# Patient Record
Sex: MALE | Race: WHITE | NOT HISPANIC OR LATINO | Employment: FULL TIME | URBAN - METROPOLITAN AREA
[De-identification: names, ages, dates, MRNs, and addresses within clinical notes are randomized per-mention and may not be internally consistent; named-entity substitution may affect disease eponyms.]

---

## 2017-07-06 ENCOUNTER — APPOINTMENT (EMERGENCY)
Dept: RADIOLOGY | Facility: HOSPITAL | Age: 57
End: 2017-07-06
Payer: COMMERCIAL

## 2017-07-06 ENCOUNTER — HOSPITAL ENCOUNTER (EMERGENCY)
Facility: HOSPITAL | Age: 57
Discharge: HOME | End: 2017-07-06
Attending: EMERGENCY MEDICINE | Admitting: EMERGENCY MEDICINE
Payer: COMMERCIAL

## 2017-07-06 VITALS
BODY MASS INDEX: 28.63 KG/M2 | OXYGEN SATURATION: 97 % | HEIGHT: 70 IN | DIASTOLIC BLOOD PRESSURE: 95 MMHG | SYSTOLIC BLOOD PRESSURE: 175 MMHG | RESPIRATION RATE: 18 BRPM | WEIGHT: 200 LBS | HEART RATE: 109 BPM | TEMPERATURE: 98.5 F

## 2017-07-06 DIAGNOSIS — L03.90 CELLULITIS: Primary | ICD-10-CM

## 2017-07-06 LAB
ALBUMIN SERPL BCP-MCNC: 4.3 G/DL (ref 3.5–5)
ALP SERPL-CCNC: 85 U/L (ref 46–116)
ALT SERPL W P-5'-P-CCNC: 62 U/L (ref 12–78)
ANION GAP SERPL CALCULATED.3IONS-SCNC: 9 MMOL/L (ref 4–13)
AST SERPL W P-5'-P-CCNC: 25 U/L (ref 5–45)
BASOPHILS # BLD AUTO: 0 THOUSANDS/ΜL (ref 0–0.1)
BASOPHILS NFR BLD AUTO: 0 % (ref 0–1)
BILIRUB SERPL-MCNC: 0.3 MG/DL (ref 0.2–1)
BUN SERPL-MCNC: 21 MG/DL (ref 5–25)
CALCIUM SERPL-MCNC: 9.3 MG/DL (ref 8.3–10.1)
CHLORIDE SERPL-SCNC: 105 MMOL/L (ref 100–108)
CO2 SERPL-SCNC: 26 MMOL/L (ref 21–32)
CREAT SERPL-MCNC: 1.29 MG/DL (ref 0.6–1.3)
EOSINOPHIL # BLD AUTO: 0.1 THOUSAND/ΜL (ref 0–0.61)
EOSINOPHIL NFR BLD AUTO: 1 % (ref 0–6)
ERYTHROCYTE [DISTWIDTH] IN BLOOD BY AUTOMATED COUNT: 13.4 % (ref 11.6–15.1)
ERYTHROCYTE [SEDIMENTATION RATE] IN BLOOD: 2 MM/HOUR (ref 2–10)
GFR SERPL CREATININE-BSD FRML MDRD: 58 ML/MIN/1.73SQ M
GLUCOSE SERPL-MCNC: 106 MG/DL (ref 65–140)
HCT VFR BLD AUTO: 44.7 % (ref 42–52)
HGB BLD-MCNC: 15.4 G/DL (ref 14–18)
LACTATE SERPL-SCNC: 1.1 MMOL/L (ref 0.5–2)
LYMPHOCYTES # BLD AUTO: 1 THOUSANDS/ΜL (ref 0.6–4.47)
LYMPHOCYTES NFR BLD AUTO: 16 % (ref 14–44)
MCH RBC QN AUTO: 29 PG (ref 27–31)
MCHC RBC AUTO-ENTMCNC: 34.4 G/DL (ref 31.4–37.4)
MCV RBC AUTO: 84 FL (ref 82–98)
MONOCYTES # BLD AUTO: 0.8 THOUSAND/ΜL (ref 0.17–1.22)
MONOCYTES NFR BLD AUTO: 12 % (ref 4–12)
NEUTROPHILS # BLD AUTO: 4.5 THOUSANDS/ΜL (ref 1.85–7.62)
NEUTS SEG NFR BLD AUTO: 70 % (ref 43–75)
NRBC BLD AUTO-RTO: 0 /100 WBCS
PLATELET # BLD AUTO: 178 THOUSANDS/UL (ref 130–400)
PMV BLD AUTO: 9 FL (ref 8.9–12.7)
POTASSIUM SERPL-SCNC: 4 MMOL/L (ref 3.5–5.3)
PROT SERPL-MCNC: 7.2 G/DL (ref 6.4–8.2)
RBC # BLD AUTO: 5.3 MILLION/UL (ref 4.7–6.1)
SODIUM SERPL-SCNC: 140 MMOL/L (ref 136–145)
WBC # BLD AUTO: 6.4 THOUSAND/UL (ref 4.8–10.8)

## 2017-07-06 PROCEDURE — 83605 ASSAY OF LACTIC ACID: CPT | Performed by: EMERGENCY MEDICINE

## 2017-07-06 PROCEDURE — 71260 CT THORAX DX C+: CPT

## 2017-07-06 PROCEDURE — 36415 COLL VENOUS BLD VENIPUNCTURE: CPT | Performed by: EMERGENCY MEDICINE

## 2017-07-06 PROCEDURE — 80053 COMPREHEN METABOLIC PANEL: CPT | Performed by: EMERGENCY MEDICINE

## 2017-07-06 PROCEDURE — 96365 THER/PROPH/DIAG IV INF INIT: CPT

## 2017-07-06 PROCEDURE — 87040 BLOOD CULTURE FOR BACTERIA: CPT | Performed by: EMERGENCY MEDICINE

## 2017-07-06 PROCEDURE — 85025 COMPLETE CBC W/AUTO DIFF WBC: CPT | Performed by: EMERGENCY MEDICINE

## 2017-07-06 PROCEDURE — 85652 RBC SED RATE AUTOMATED: CPT | Performed by: EMERGENCY MEDICINE

## 2017-07-06 PROCEDURE — 99284 EMERGENCY DEPT VISIT MOD MDM: CPT

## 2017-07-06 PROCEDURE — 96366 THER/PROPH/DIAG IV INF ADDON: CPT

## 2017-07-06 RX ORDER — SULFAMETHOXAZOLE AND TRIMETHOPRIM 800; 160 MG/1; MG/1
1 TABLET ORAL EVERY 12 HOURS SCHEDULED
COMMUNITY
End: 2017-07-09 | Stop reason: ALTCHOICE

## 2017-07-06 RX ORDER — CLINDAMYCIN HYDROCHLORIDE 150 MG/1
300 CAPSULE ORAL EVERY 6 HOURS
Qty: 40 CAPSULE | Refills: 0 | Status: SHIPPED | OUTPATIENT
Start: 2017-07-06 | End: 2017-07-16

## 2017-07-06 RX ORDER — DOXYCYCLINE HYCLATE 100 MG/1
100 CAPSULE ORAL 2 TIMES DAILY
Qty: 20 CAPSULE | Refills: 0 | Status: SHIPPED | OUTPATIENT
Start: 2017-07-06 | End: 2017-07-16

## 2017-07-06 RX ADMIN — IOHEXOL 85 ML: 350 INJECTION, SOLUTION INTRAVENOUS at 20:53

## 2017-07-06 RX ADMIN — VANCOMYCIN HYDROCHLORIDE 1250 MG: 1 INJECTION, POWDER, LYOPHILIZED, FOR SOLUTION INTRAVENOUS at 19:25

## 2017-07-09 ENCOUNTER — HOSPITAL ENCOUNTER (EMERGENCY)
Facility: HOSPITAL | Age: 57
Discharge: HOME | End: 2017-07-09
Admitting: EMERGENCY MEDICINE
Payer: COMMERCIAL

## 2017-07-09 VITALS
DIASTOLIC BLOOD PRESSURE: 90 MMHG | HEIGHT: 70 IN | OXYGEN SATURATION: 97 % | HEART RATE: 90 BPM | SYSTOLIC BLOOD PRESSURE: 158 MMHG | BODY MASS INDEX: 28.63 KG/M2 | TEMPERATURE: 98.4 F | RESPIRATION RATE: 18 BRPM | WEIGHT: 200 LBS

## 2017-07-09 DIAGNOSIS — Z51.89 VISIT FOR WOUND CHECK: Primary | ICD-10-CM

## 2017-07-09 PROCEDURE — 99282 EMERGENCY DEPT VISIT SF MDM: CPT

## 2017-07-11 LAB
BACTERIA BLD CULT: NORMAL
BACTERIA BLD CULT: NORMAL

## 2019-11-19 ENCOUNTER — HOSPITAL ENCOUNTER (EMERGENCY)
Facility: HOSPITAL | Age: 59
Discharge: HOME | End: 2019-11-19
Attending: EMERGENCY MEDICINE
Payer: COMMERCIAL

## 2019-11-19 ENCOUNTER — APPOINTMENT (EMERGENCY)
Dept: RADIOLOGY | Facility: HOSPITAL | Age: 59
End: 2019-11-19
Payer: COMMERCIAL

## 2019-11-19 VITALS
SYSTOLIC BLOOD PRESSURE: 138 MMHG | BODY MASS INDEX: 26.6 KG/M2 | HEART RATE: 107 BPM | DIASTOLIC BLOOD PRESSURE: 87 MMHG | WEIGHT: 190 LBS | RESPIRATION RATE: 20 BRPM | HEIGHT: 71 IN | TEMPERATURE: 97.7 F | OXYGEN SATURATION: 98 %

## 2019-11-19 DIAGNOSIS — S61.411A LACERATION OF RIGHT HAND: Primary | ICD-10-CM

## 2019-11-19 PROCEDURE — 99283 EMERGENCY DEPT VISIT LOW MDM: CPT

## 2019-11-19 PROCEDURE — 73130 X-RAY EXAM OF HAND: CPT

## 2019-11-19 RX ORDER — GINSENG 100 MG
1 CAPSULE ORAL ONCE
Status: COMPLETED | OUTPATIENT
Start: 2019-11-19 | End: 2019-11-19

## 2019-11-19 RX ORDER — LIDOCAINE HYDROCHLORIDE 10 MG/ML
5 INJECTION, SOLUTION EPIDURAL; INFILTRATION; INTRACAUDAL; PERINEURAL ONCE
Status: COMPLETED | OUTPATIENT
Start: 2019-11-19 | End: 2019-11-19

## 2019-11-19 RX ADMIN — BACITRACIN 1 SMALL APPLICATION: 500 OINTMENT TOPICAL at 09:54

## 2019-11-19 RX ADMIN — LIDOCAINE HYDROCHLORIDE 5 ML: 10 INJECTION, SOLUTION EPIDURAL; INFILTRATION; INTRACAUDAL; PERINEURAL at 09:53

## 2019-11-19 NOTE — ED PROVIDER NOTES
History  Chief Complaint   Patient presents with    Hand Laceration     1 cm laceration to patient's R palm  Patient reports he stabbed hand with piece of glass with throwing something in trash  Patient reports he is UTD on tetanus vac       61-year-old male presenting today with a right hand laceration that occurred this morning after he was taking out his garbage and a piece of glass cut him  States he believes he got all the glass out  Patient was wearing gloves at the time  This is his dominant hand  Currently no pain  Up-to-date on tetanus vaccination  Denies numbness, paresthesias, weakness  None       History reviewed  No pertinent past medical history  History reviewed  No pertinent surgical history  History reviewed  No pertinent family history  I have reviewed and agree with the history as documented  Social History     Tobacco Use    Smoking status: Never Smoker    Smokeless tobacco: Never Used   Substance Use Topics    Alcohol use: No    Drug use: No        Review of Systems   Constitutional: Negative  HENT: Negative  Eyes: Negative  Respiratory: Negative  Cardiovascular: Negative  Gastrointestinal: Negative  Genitourinary: Negative  Musculoskeletal: Negative  Skin: Positive for wound  Negative for color change, pallor and rash  Neurological: Negative  All other systems reviewed and are negative  Physical Exam  Physical Exam   Constitutional: He is oriented to person, place, and time  He appears well-developed and well-nourished  HENT:   Head: Normocephalic and atraumatic  Nose: Nose normal    Eyes: Conjunctivae are normal    Cardiovascular: Normal rate and intact distal pulses  Pulmonary/Chest: Effort normal    S PO2 is 98% indicating adequate oxygenation   Musculoskeletal: Normal range of motion  He exhibits no edema or deformity  Arms:  Neurological: He is alert and oriented to person, place, and time     Skin: Skin is warm and dry  Capillary refill takes less than 2 seconds  Nursing note and vitals reviewed  Vital Signs  ED Triage Vitals [11/19/19 0933]   Temperature Pulse Respirations Blood Pressure SpO2   97 7 °F (36 5 °C) (!) 107 20 138/87 98 %      Temp Source Heart Rate Source Patient Position - Orthostatic VS BP Location FiO2 (%)   Tympanic Monitor Sitting Right arm --      Pain Score       No Pain           Vitals:    11/19/19 0933   BP: 138/87   Pulse: (!) 107   Patient Position - Orthostatic VS: Sitting         Visual Acuity      ED Medications  Medications   lidocaine (PF) (XYLOCAINE-MPF) 1 % injection 5 mL (5 mL Infiltration Given 11/19/19 0953)   bacitracin topical ointment 1 small application (1 small application Topical Given 11/19/19 0954)       Diagnostic Studies  Results Reviewed     None                 XR hand 3+ views RIGHT   ED Interpretation by Brandon Hendricks PA-C (11/19 1012)   No acute osseous abnormality or foreign bodies noted  Final Result by Dalila Ruth MD (11/19 1011)      No acute osseous abnormality  Workstation performed: [id number]                    Procedures  Laceration repair  Date/Time: 11/19/2019 10:14 AM  Performed by: Brandon Hendricks PA-C  Authorized by: Brandon Hendricks PA-C   Consent: Verbal consent obtained  Risks and benefits: risks, benefits and alternatives were discussed  Consent given by: patient  Patient understanding: patient states understanding of the procedure being performed  Patient consent: the patient's understanding of the procedure matches consent given  Procedure consent: procedure consent matches procedure scheduled  Relevant documents: relevant documents present and verified  Test results: test results available and properly labeled  Site marked: the operative site was marked  Radiology Images displayed and confirmed   If images not available, report reviewed: imaging studies available  Required items: required blood products, implants, devices, and special equipment available  Patient identity confirmed: verbally with patient  Time out: Immediately prior to procedure a "time out" was called to verify the correct patient, procedure, equipment, support staff and site/side marked as required  Body area: upper extremity  Location details: right hand  Laceration length: 2 cm  Foreign bodies: no foreign bodies  Tendon involvement: none  Nerve involvement: none  Anesthesia: local infiltration    Anesthesia:  Local Anesthetic: lidocaine 1% without epinephrine    Wound Dehiscence:  Superficial Wound Dehiscence: simple closure      Procedure Details:  Preparation: Patient was prepped and draped in the usual sterile fashion  Irrigation solution: saline  Irrigation method: syringe  Amount of cleaning: standard  Debridement: none  Degree of undermining: none  Skin closure: 5-0 nylon  Number of sutures: 2  Technique: simple  Approximation: close  Approximation difficulty: simple  Dressing: antibiotic ointment, 4x4 sterile gauze and gauze roll  Patient tolerance: Patient tolerated the procedure well with no immediate complications             ED Course                               MDM  Number of Diagnoses or Management Options  Laceration of right hand:   Diagnosis management comments: Small laceration was closed with 2 stitches  Patient formed return 10 days for suture removal or sooner for any signs infection or worsening of symptoms  Patient also given thorough wound care instructions informed that he is at risk for this wounds putting open given poor location over joint  Patient verbalizes understanding and agrees with the above assessment plan         Amount and/or Complexity of Data Reviewed  Tests in the radiology section of CPT®: reviewed and ordered  Review and summarize past medical records: yes  Independent visualization of images, tracings, or specimens: yes        Disposition  Final diagnoses:   Laceration of right hand     Time reflects when diagnosis was documented in both MDM as applicable and the Disposition within this note     Time User Action Codes Description Comment    11/19/2019 10:13 AM Андрей Conner Add [[id number]] Laceration of right hand       ED Disposition     ED Disposition Condition Date/Time Comment    Discharge Stable Tue Nov 19, 2019 10:13 AM Shonda Hughes discharge to home/self care  Follow-up Information     Follow up With Specialties Details Why Contact Info Additional [address] Emergency Department Emergency Medicine Go in 10 days For suture removal or sooner for re-evaluation [address] [facility] [address]  [phone] [facility] ED[facility] [address]          Patient's Medications    No medications on file     No discharge procedures on file      ED Provider  Electronically Signed by           Jerry Cardona PA-C  11/19/19 1319

## 2022-07-11 ENCOUNTER — OFFICE VISIT (OUTPATIENT)
Dept: URGENT CARE | Facility: CLINIC | Age: 62
End: 2022-07-11
Payer: COMMERCIAL

## 2022-07-11 VITALS — RESPIRATION RATE: 14 BRPM | HEART RATE: 103 BPM | TEMPERATURE: 97 F | OXYGEN SATURATION: 99 %

## 2022-07-11 DIAGNOSIS — M79.674 GREAT TOE PAIN, RIGHT: Primary | ICD-10-CM

## 2022-07-11 PROCEDURE — 99214 OFFICE O/P EST MOD 30 MIN: CPT | Performed by: PHYSICIAN ASSISTANT

## 2022-07-11 RX ORDER — COLCHICINE 0.6 MG/1
0.6 TABLET ORAL DAILY
Qty: 6 TABLET | Refills: 0 | Status: SHIPPED | OUTPATIENT
Start: 2022-07-11

## 2022-07-11 NOTE — PROGRESS NOTES
[facility] [employer] [facility]        NAME: Eugenio Correa is a 63 y o  male  : 1960    MRN: [id number]  DATE: 2022  TIME: 6:04 PM    Assessment and Plan   Great toe pain, right [M79 [id number]]  1  Great toe pain, right  colchicine (COLCRYS) 0 6 mg tablet         Patient Instructions   There are no Patient Instructions on file for this visit  Follow up with PCP in 3-5 days  Proceed to  ER if symptoms worsen  Chief Complaint     Chief Complaint   Patient presents with    Pain     Pt presents with right foot swelling  Redness, below the right big toe; started on Saturday         History of Present Illness       The pt is a 30-year-old male presenting for possible gout in his right great toe  He reported pain, erythema and swelling of the great toe  Symptoms have been going on for 3 days  He denies any injury  Prior episodes  Last episode was 2 years ago  Review of Systems   Review of Systems   Constitutional: Negative for activity change, appetite change, chills, diaphoresis and fever  HENT: Negative for congestion, rhinorrhea and sore throat  Respiratory: Negative for cough, chest tightness and shortness of breath  Cardiovascular: Negative for chest pain and palpitations  Gastrointestinal: Negative for abdominal pain, diarrhea, nausea and vomiting  Musculoskeletal: Positive for gait problem, joint swelling and myalgias  Negative for arthralgias  Skin: Positive for color change  Negative for pallor, rash and wound  Neurological: Negative for headaches           Current Medications       Current Outpatient Medications:     colchicine (COLCRYS) 0 6 mg tablet, Take 1 tablet (0 6 mg total) by mouth daily Take two 0 6-mg tablets orally NOW followed by 0 6 mg (1 tablet) one hour later Take 1 a day until resolution, Disp: 6 tablet, Rfl: 0    Current Allergies     Allergies as of 2022    (No Known Allergies)            The following portions of the patient's history were reviewed and updated as appropriate: allergies, current medications, past family history, past medical history, past social history, past surgical history and problem list      Past Medical History:   Diagnosis Date    Gout        Past Surgical History:   Procedure Laterality Date    NO PAST SURGERIES         History reviewed  No pertinent family history  Medications have been verified  Objective   Pulse 103   Temp (!) 97 °F (36 1 °C)   Resp 14   SpO2 99%        Physical Exam     Physical Exam  Vitals and nursing note reviewed  Constitutional:       General: He is not in acute distress  Appearance: Normal appearance  He is normal weight  He is not ill-appearing, toxic-appearing or diaphoretic  HENT:      Head: Normocephalic and atraumatic  Cardiovascular:      Rate and Rhythm: Normal rate and regular rhythm  Heart sounds: Normal heart sounds  No murmur heard  No friction rub  No gallop  Pulmonary:      Effort: Pulmonary effort is normal  No respiratory distress  Breath sounds: Normal breath sounds  No stridor  No wheezing, rhonchi or rales  Chest:      Chest wall: No tenderness  Musculoskeletal:         General: Swelling and tenderness present  Cervical back: Normal range of motion  Comments: Redness and tenderness to the R great toe     Lymphadenopathy:      Cervical: No cervical adenopathy  Skin:     General: Skin is warm and dry  Capillary Refill: Capillary refill takes less than 2 seconds  Findings: Erythema present  Neurological:      Mental Status: He is alert

## 2023-06-01 ENCOUNTER — HOSPITAL ENCOUNTER (EMERGENCY)
Facility: HOSPITAL | Age: 63
Discharge: HOME | End: 2023-06-01
Attending: EMERGENCY MEDICINE
Payer: COMMERCIAL

## 2023-06-01 VITALS
SYSTOLIC BLOOD PRESSURE: 152 MMHG | DIASTOLIC BLOOD PRESSURE: 91 MMHG | TEMPERATURE: 96.2 F | BODY MASS INDEX: 28.51 KG/M2 | WEIGHT: 204.4 LBS | HEART RATE: 104 BPM | OXYGEN SATURATION: 100 % | RESPIRATION RATE: 18 BRPM

## 2023-06-01 DIAGNOSIS — T14.8XXA SPLINTER IN SKIN: Primary | ICD-10-CM

## 2023-06-01 PROCEDURE — 90471 IMMUNIZATION ADMIN: CPT

## 2023-06-01 PROCEDURE — 90715 TDAP VACCINE 7 YRS/> IM: CPT | Performed by: PHYSICIAN ASSISTANT

## 2023-06-01 PROCEDURE — 99283 EMERGENCY DEPT VISIT LOW MDM: CPT

## 2023-06-01 RX ORDER — LIDOCAINE HYDROCHLORIDE 10 MG/ML
10 INJECTION, SOLUTION EPIDURAL; INFILTRATION; INTRACAUDAL; PERINEURAL ONCE
Status: COMPLETED | OUTPATIENT
Start: 2023-06-01 | End: 2023-06-01

## 2023-06-01 RX ORDER — GINSENG 100 MG
1 CAPSULE ORAL ONCE
Status: COMPLETED | OUTPATIENT
Start: 2023-06-01 | End: 2023-06-01

## 2023-06-01 RX ORDER — CEPHALEXIN 500 MG/1
500 CAPSULE ORAL EVERY 6 HOURS SCHEDULED
Qty: 20 CAPSULE | Refills: 0 | Status: SHIPPED | OUTPATIENT
Start: 2023-06-01 | End: 2023-06-06

## 2023-06-01 RX ORDER — CEPHALEXIN 500 MG/1
500 CAPSULE ORAL ONCE
Status: COMPLETED | OUTPATIENT
Start: 2023-06-01 | End: 2023-06-01

## 2023-06-01 RX ADMIN — TETANUS TOXOID, REDUCED DIPHTHERIA TOXOID AND ACELLULAR PERTUSSIS VACCINE, ADSORBED 0.5 ML: 5; 2.5; 8; 8; 2.5 SUSPENSION INTRAMUSCULAR at 13:47

## 2023-06-01 RX ADMIN — BACITRACIN 1 SMALL APPLICATION: 500 OINTMENT TOPICAL at 13:51

## 2023-06-01 RX ADMIN — CEPHALEXIN 500 MG: 500 CAPSULE ORAL at 13:47

## 2023-06-01 RX ADMIN — LIDOCAINE HYDROCHLORIDE 10 ML: 10 INJECTION, SOLUTION EPIDURAL; INFILTRATION; INTRACAUDAL; PERINEURAL at 13:15

## 2023-06-01 NOTE — DISCHARGE INSTRUCTIONS
Warm soaks 15 minutes three times daily    Follow up with hand orthopedist Dr Louise Blair for further evaluation as advised    Return to ED for signs of wound infection

## 2023-06-01 NOTE — ED PROVIDER NOTES
History  Chief Complaint   Patient presents with   • Foreign Body in Skin     States he had his hand on a handrail going down steps and got a splinter in left second finger  Patient is a 68-year-old male reports he was rushing down a staircase at home when he got a splinter in his left index finger  He reports trying to remove from the entry and exit point but was unsuccessful  No finger numbness or tingling  Unsure of last tetanus shot  No other complaints          Prior to Admission Medications   Prescriptions Last Dose Informant Patient Reported? Taking?   colchicine (COLCRYS) 0 6 mg tablet   No No   Sig: Take 1 tablet (0 6 mg total) by mouth daily Take two 0 6-mg tablets orally NOW followed by 0 6 mg (1 tablet) one hour later Take 1 a day until resolution      Facility-Administered Medications: None       Past Medical History:   Diagnosis Date   • Gout        Past Surgical History:   Procedure Laterality Date   • NO PAST SURGERIES         History reviewed  No pertinent family history  I have reviewed and agree with the history as documented  E-Cigarette/Vaping   • E-Cigarette Use Never User      E-Cigarette/Vaping Substances     Social History     Tobacco Use   • Smoking status: Never   • Smokeless tobacco: Never   Vaping Use   • Vaping Use: Never used   Substance Use Topics   • Alcohol use: Yes   • Drug use: No       Review of Systems   Constitutional: Negative for chills and fever  HENT: Negative for ear pain and sore throat  Respiratory: Negative for cough and shortness of breath  Cardiovascular: Negative for chest pain and palpitations  Gastrointestinal: Negative for abdominal pain and vomiting  Genitourinary: Negative for dysuria and hematuria  Musculoskeletal: Negative for arthralgias and back pain  Skin: Positive for wound  Negative for color change and rash  Neurological: Negative for syncope  All other systems reviewed and are negative        Physical Exam  Physical Exam  Vitals and nursing note reviewed  Constitutional:       General: He is not in acute distress  Appearance: Normal appearance  He is not ill-appearing, toxic-appearing or diaphoretic  Cardiovascular:      Rate and Rhythm: Normal rate and regular rhythm  Pulses: Normal pulses  Heart sounds: Normal heart sounds  Pulmonary:      Effort: Pulmonary effort is normal       Breath sounds: Normal breath sounds  Skin:     General: Skin is warm and dry  Capillary Refill: Capillary refill takes less than 2 seconds  Comments: Left second finger: no protruding splinter noted  There is subcutaneous dark punctate area finger pad and another area distal fingerpad at nailmargin  Neurological:      Mental Status: He is alert and oriented to person, place, and time  Sensory: No sensory deficit           Vital Signs  ED Triage Vitals [06/01/23 1257]   Temperature Pulse Respirations Blood Pressure SpO2   (!) 96 2 °F (35 7 °C) 104 18 152/91 100 %      Temp Source Heart Rate Source Patient Position - Orthostatic VS BP Location FiO2 (%)   Tympanic Monitor Sitting Right arm --      Pain Score       --           Vitals:    06/01/23 1257   BP: 152/91   Pulse: 104   Patient Position - Orthostatic VS: Sitting         Visual Acuity      ED Medications  Medications   lidocaine (PF) (XYLOCAINE-MPF) 1 % injection 10 mL (10 mL Infiltration Given 6/1/23 1315)   cephalexin (KEFLEX) capsule 500 mg (500 mg Oral Given 6/1/23 1347)   tetanus-diphtheria-acellular pertussis (BOOSTRIX) IM injection 0 5 mL (0 5 mL Intramuscular Given 6/1/23 1347)   bacitracin topical ointment 1 small application (1 small application Topical Given 6/1/23 1351)       Diagnostic Studies  Results Reviewed     None                 No orders to display              Procedures  Foreign Body - Embedded    Date/Time: 6/1/2023 2:17 PM    Performed by: Ciaran Rome PA-C  Authorized by: Ciaran Rome PA-C    Patient location: ED  Other Assisting Provider: No    Consent:     Consent obtained:  Verbal    Consent given by:  Patient    Risks discussed:  Bleeding, infection, worsening of condition, pain, nerve damage and incomplete removal    Alternatives discussed:  Observation  Universal protocol:     Procedure explained and questions answered to patient or proxy's satisfaction: yes      Relevant documents present and verified: yes      Test results available and properly labeled: yes      Radiology Images displayed and confirmed  If images not available, report reviewed : yes      Required blood products, implants, devices, and special equipment available: yes      Site/side marked: yes      Immediately prior to procedure, a time out was called: yes      Patient identity confirmed:  Verbally with patient and arm band  Location:     Location:  Finger    Finger location:  L index finger    Depth:  Subcutaneous    Tendon involvement:  None  Pre-procedure details:     Imaging:  None    Preparation: Patient was prepped and draped in usual sterile fashion    Anesthesia (see MAR for exact dosages): Anesthesia method:  Nerve block    Block anesthetic:  Lidocaine 1% w/o epi    Block injection procedure:  Anatomic landmarks identified and introduced needle    Block outcome:  Anesthesia achieved  Procedure details:     Scalpel size:  11    Incision length:  0 5    Localization method:  Probed    Bloodless field: no      Removal mechanism: Forceps    Removal Method:  Open and percutaneous    Foreign bodies recovered:  2    Intact foreign body removal: no    Post-procedure details:     Neurovascular status: intact      Confirmation:  No additional foreign bodies on visualization    Skin closure:  None    Dressing:  Antibiotic ointment    Patient tolerance of procedure:   Tolerated well, no immediate complications  Comments:      Small foreign body removed proximally and distally              ED Course                               SBIRT 22yo+ Flowsheet Row Most Recent Value   Initial Alcohol Screen: US AUDIT-C     1  How often do you have a drink containing alcohol? 0 Filed at: 06/01/2023 1300   Audit-C Score 0 Filed at: 06/01/2023 1300   KASH: How many times in the past year have you    Used an illegal drug or used a prescription medication for non-medical reasons? Never Filed at: 06/01/2023 1300                    Medical Decision Making  Left second finger was anesthetized by digital block with 1% plain lidocaine  Under sterile chlorhexidine prep, removed small splinters from entrance and exit point  Patient was advised there may be retained splinter  in between which cannot be visualized at this time  Tiger texted hand ortho Dr Luh Muñoz  She can see patient in office follow up I advised  warm soaks 3 times daily  Keflex given in the ER and prescription for the same  Return precautions given  Risk  OTC drugs  Prescription drug management  Disposition  Final diagnoses:   Splinter in skin     Time reflects when diagnosis was documented in both MDM as applicable and the Disposition within this note     Time User Action Codes Description Comment    6/1/2023  1:43 PM Con, [facility] Jamison Cummingstenzin Gatica in skin       ED Disposition     ED Disposition   Discharge    Condition   Stable    Date/Time   Thu Jun 1, 2023  1:43 PM    Comment   Cindy Card discharge to home/self care                 Follow-up Information     Follow up With Specialties Details Why [address] [facility]   [address] [place] [address] Jani Mann  [phone]            Discharge Medication List as of 6/1/2023  1:47 PM      START taking these medications    Details   cephalexin (KEFLEX) 500 mg capsule Take 1 capsule (500 mg total) by mouth every 6 (six) hours for 5 days, Starting Thu 6/1/2023, Until Tue 6/6/2023, Normal         CONTINUE these medications which have NOT CHANGED Details   colchicine (COLCRYS) 0 6 mg tablet Take 1 tablet (0 6 mg total) by mouth daily Take two 0 6-mg tablets orally NOW followed by 0 6 mg (1 tablet) one hour later Take 1 a day until resolution, Starting Mon 7/11/2022, Normal             No discharge procedures on file      PDMP Review     None          ED Provider  Electronically Signed by           Ciaran Rome PA-C  06/01/23 9326

## 2024-02-29 ENCOUNTER — RA CDI HCC (OUTPATIENT)
Dept: OTHER | Facility: HOSPITAL | Age: 64
End: 2024-02-29

## 2024-02-29 NOTE — PROGRESS NOTES
HCC coding opportunities       Chart reviewed, no opportunity found: CHART REVIEWED, NO OPPORTUNITY FOUND        Patients Insurance        Commercial Insurance: [employer]

## 2024-03-07 ENCOUNTER — OFFICE VISIT (OUTPATIENT)
Dept: FAMILY MEDICINE CLINIC | Facility: CLINIC | Age: 64
End: 2024-03-07
Payer: COMMERCIAL

## 2024-03-07 VITALS
SYSTOLIC BLOOD PRESSURE: 140 MMHG | DIASTOLIC BLOOD PRESSURE: 80 MMHG | OXYGEN SATURATION: 99 % | HEIGHT: 71 IN | WEIGHT: 205 LBS | HEART RATE: 97 BPM | BODY MASS INDEX: 28.7 KG/M2

## 2024-03-07 DIAGNOSIS — E78.5 DYSLIPIDEMIA: ICD-10-CM

## 2024-03-07 DIAGNOSIS — Z12.5 SCREENING FOR PROSTATE CANCER: ICD-10-CM

## 2024-03-07 DIAGNOSIS — R39.14 BENIGN PROSTATIC HYPERPLASIA WITH INCOMPLETE BLADDER EMPTYING: Primary | ICD-10-CM

## 2024-03-07 DIAGNOSIS — N40.1 BENIGN PROSTATIC HYPERPLASIA WITH INCOMPLETE BLADDER EMPTYING: Primary | ICD-10-CM

## 2024-03-07 DIAGNOSIS — Z13.0 SCREENING FOR DEFICIENCY ANEMIA: ICD-10-CM

## 2024-03-07 DIAGNOSIS — Z13.29 SCREENING FOR THYROID DISORDER: ICD-10-CM

## 2024-03-07 DIAGNOSIS — R73.03 PREDIABETES: ICD-10-CM

## 2024-03-07 DIAGNOSIS — I10 PRIMARY HYPERTENSION: ICD-10-CM

## 2024-03-07 DIAGNOSIS — Z12.11 SCREENING FOR COLON CANCER: ICD-10-CM

## 2024-03-07 DIAGNOSIS — Z13.1 SCREENING FOR DIABETES MELLITUS: ICD-10-CM

## 2024-03-07 PROCEDURE — 99204 OFFICE O/P NEW MOD 45 MIN: CPT | Performed by: INTERNAL MEDICINE

## 2024-03-07 RX ORDER — TAMSULOSIN HYDROCHLORIDE 0.4 MG/1
0.4 CAPSULE ORAL
Qty: 30 CAPSULE | Refills: 1 | Status: SHIPPED | OUTPATIENT
Start: 2024-03-07

## 2024-03-07 NOTE — ASSESSMENT & PLAN NOTE
Patient blood pressure has been running slightly on the higher side check x 2 it is around 140/80 first time 140/ 84-second time.  He does not have extra salt with his diet.  He is very active at the workplace.  No cardiovascular exercises.

## 2024-03-07 NOTE — ASSESSMENT & PLAN NOTE
As mentioned in HPI patient with increased frequency urgency hesitancy with passing urine rectal examination question mild enlargement in prostate will start the patient on tamsulosin 0.4 mg at bedtime and see how much difference it makes we will also get lab work done urinalysis and culture also done.

## 2024-03-07 NOTE — PROGRESS NOTES
Office Visit Note  24     Bon Snyder 63 y.o. male MRN: [id number]  : 1960    Assessment:     1. Benign prostatic hyperplasia with incomplete bladder emptying  Assessment & Plan:  As mentioned in HPI patient with increased frequency urgency hesitancy with passing urine rectal examination question mild enlargement in prostate will start the patient on tamsulosin 0.4 mg at bedtime and see how much difference it makes we will also get lab work done urinalysis and culture also done.    Orders:  -     tamsulosin (FLOMAX) 0.4 mg; Take 1 capsule (0.4 mg total) by mouth daily with dinner    2. Primary hypertension  Assessment & Plan:  Patient blood pressure has been running slightly on the higher side check x 2 it is around 140/80 first time 140/ 84-second time.  He does not have extra salt with his diet.  He is very active at the workplace.  No cardiovascular exercises.      3. Prediabetes  -     Albumin / creatinine urine ratio; Future  -     Comprehensive metabolic panel; Future  -     Hemoglobin A1C; Future  -     Urinalysis with microscopic; Future    4. Screening for deficiency anemia  -     CBC and differential; Future    5. Dyslipidemia  -     Lipid panel; Future    6. Screening for thyroid disorder  -     TSH, 3rd generation with Free T4 reflex; Future    7. Screening for diabetes mellitus    8. Screening for prostate cancer  -     PSA, total and free; Future    9. Screening for colon cancer  -     Cologuard               Discussion Summary and Plan:  Today's care plan and medications were reviewed with patient in detail and all their questions answered to their satisfaction.    Chief Complaint   Patient presents with    Establish Care      Subjective:  Patient is coming here for evaluation seeing him after a long.  Of time with no significant past medical history.  For the last 3 to 4 months patient has been having problem passing urine there is an increased frequency urgency and hesitancy  especially in the nighttime he has to get up frequently to pass urine.  No pain in the kidney area in the flank area no pain in the lower abdomen.    Patient's past medical history none surgical history none patient is non-smoker social alcohol.    Family history of diabetes with both father and mother but there is no history of prostate problems in the family his brother is also a diabetic.    Patient is going to have a Cologuard test done since he does not want to have colonoscopy will order the same        The following portions of the patient's history were reviewed and updated as appropriate: allergies, current medications, past family history, past medical history, past social history, past surgical history and problem list.    Review of Systems   Constitutional:  Negative for chills and fever.   HENT:  Negative for ear pain and sore throat.    Eyes:  Negative for pain and visual disturbance.   Respiratory:  Negative for cough and shortness of breath.    Cardiovascular:  Negative for chest pain and palpitations.   Gastrointestinal:  Negative for abdominal pain and vomiting.   Genitourinary:  Positive for difficulty urinating and frequency. Negative for dysuria and hematuria.   Musculoskeletal:  Negative for arthralgias and back pain.   Skin:  Negative for color change and rash.   Neurological:  Negative for seizures and syncope.   All other systems reviewed and are negative.        Historical Information   Patient Active Problem List   Diagnosis    Benign prostatic hyperplasia with incomplete bladder emptying    Primary hypertension     Past Medical History:   Diagnosis Date    Gout      Past Surgical History:   Procedure Laterality Date    NO PAST SURGERIES       Social History     Substance and Sexual Activity   Alcohol Use Yes     Social History     Substance and Sexual Activity   Drug Use No     Social History     Tobacco Use   Smoking Status Never   Smokeless Tobacco Never     History reviewed. No  "pertinent family history.  Health Maintenance Due   Topic    Hepatitis C Screening     HIV Screening     Annual Physical     Colorectal Cancer Screening     Zoster Vaccine (1 of 2)    Influenza Vaccine (1)    COVID-19 Vaccine (1 - 2023-24 season)      Meds/Allergies       Current Outpatient Medications:     tamsulosin (FLOMAX) 0.4 mg, Take 1 capsule (0.4 mg total) by mouth daily with dinner, Disp: 30 capsule, Rfl: 1      Objective:    Vitals:   /80 (BP Location: Right arm, Patient Position: Sitting, Cuff Size: Standard)   Pulse 97   Ht 5' 11\" (1.803 m)   Wt 93 kg (205 lb)   SpO2 99%   BMI 28.59 kg/m²   Body mass index is 28.59 kg/m².  Vitals:    03/07/24 1622   Weight: 93 kg (205 lb)       Physical Exam  Vitals and nursing note reviewed.   Constitutional:       Appearance: Normal appearance.   Cardiovascular:      Rate and Rhythm: Normal rate and regular rhythm.      Heart sounds: Normal heart sounds.   Pulmonary:      Effort: Pulmonary effort is normal.      Breath sounds: Normal breath sounds.   Abdominal:      General: Abdomen is flat. Bowel sounds are normal.      Palpations: Abdomen is soft.   Genitourinary:     Comments: Question enlargement of prostate  Musculoskeletal:         General: Normal range of motion.      Right lower leg: No edema.      Left lower leg: No edema.   Skin:     General: Skin is warm and dry.   Neurological:      General: No focal deficit present.      Mental Status: He is alert and oriented to person, place, and time.   Psychiatric:         Mood and Affect: Mood normal.         Behavior: Behavior normal.         Lab Review   No visits with results within 2 Month(s) from this visit.   Latest known visit with results is:   Admission on 07/06/2017, Discharged on 07/06/2017   Component Date Value Ref Range Status    WBC 07/06/2017 6.40  4.80 - 10.80 Thousand/uL Final    RBC 07/06/2017 5.30  4.70 - 6.10 Million/uL Final    Hemoglobin 07/06/2017 15.4  14.0 - 18.0 g/dL Final    " Hematocrit 07/06/2017 44.7  42.0 - 52.0 % Final    MCV 07/06/2017 84  82 - 98 fL Final    MCH 07/06/2017 29.0  27.0 - 31.0 pg Final    MCHC 07/06/2017 34.4  31.4 - 37.4 g/dL Final    RDW 07/06/2017 13.4  11.6 - 15.1 % Final    MPV 07/06/2017 9.0  8.9 - 12.7 fL Final    Platelets 07/06/2017 178  130 - 400 Thousands/uL Final    nRBC 07/06/2017 0  /100 WBCs Final    Neutrophils Relative 07/06/2017 70  43 - 75 % Final    Lymphocytes Relative 07/06/2017 16  14 - 44 % Final    Monocytes Relative 07/06/2017 12  4 - 12 % Final    Eosinophils Relative 07/06/2017 1  0 - 6 % Final    Basophils Relative 07/06/2017 0  0 - 1 % Final    Neutrophils Absolute 07/06/2017 4.50  1.85 - 7.62 Thousands/µL Final    Lymphocytes Absolute 07/06/2017 1.00  0.60 - 4.47 Thousands/µL Final    Monocytes Absolute 07/06/2017 0.80  0.17 - 1.22 Thousand/µL Final    Eosinophils Absolute 07/06/2017 0.10  0.00 - 0.61 Thousand/µL Final    Basophils Absolute 07/06/2017 0.00  0.00 - 0.10 Thousands/µL Final    Sodium 07/06/2017 140  136 - 145 mmol/L Final    Potassium 07/06/2017 4.0  3.5 - 5.3 mmol/L Final    Chloride 07/06/2017 105  100 - 108 mmol/L Final    CO2 07/06/2017 26  21 - 32 mmol/L Final    ANION GAP 07/06/2017 9  4 - 13 mmol/L Final    BUN 07/06/2017 21  5 - 25 mg/dL Final    Creatinine 07/06/2017 1.29  0.60 - 1.30 mg/dL Final    Standardized to IDMS reference method    Glucose 07/06/2017 106  65 - 140 mg/dL Final    If the patient is fasting, the ADA then defines impaired fasting glucose as > 100 mg/dL and diabetes as > or equal to 123 mg/dL.    Calcium 07/06/2017 9.3  8.3 - 10.1 mg/dL Final    AST 07/06/2017 25  5 - 45 U/L Final    ALT 07/06/2017 62  12 - 78 U/L Final    Alkaline Phosphatase 07/06/2017 85  46 - 116 U/L Final    Total Protein 07/06/2017 7.2  6.4 - 8.2 g/dL Final    Albumin 07/06/2017 4.3  3.5 - 5.0 g/dL Final    Total Bilirubin 07/06/2017 0.30  0.20 - 1.00 mg/dL Final    eGFR 07/06/2017 58.0  ml/min/1.73sq m Final    LACTIC  "ACID 07/06/2017 1.1  0.5 - 2.0 mmol/L Final    Blood Culture 07/06/2017 No Growth After 5 Days.   Final    Blood Culture 07/06/2017 No Growth After 5 Days.   Final    Sed Rate 07/06/2017 2  2 - 10 mm/hour Final         Monty Mitchell MD        \"This note has been constructed using a voice recognition system.Therefore there may be syntax, spelling, and/or grammatical errors. Please call if you have any questions. \"  "

## 2024-03-09 LAB
ALBUMIN SERPL-MCNC: 4.8 G/DL (ref 3.9–4.9)
ALBUMIN/CREAT UR: 16 MG/G CREAT (ref 0–29)
ALBUMIN/GLOB SERPL: 2.7 {RATIO} (ref 1.2–2.2)
ALP SERPL-CCNC: 83 IU/L (ref 44–121)
ALT SERPL-CCNC: 34 IU/L (ref 0–44)
APPEARANCE UR: CLEAR
AST SERPL-CCNC: 21 IU/L (ref 0–40)
BACTERIA URNS QL MICRO: NORMAL
BASOPHILS # BLD AUTO: 0 X10E3/UL (ref 0–0.2)
BASOPHILS NFR BLD AUTO: 0 %
BILIRUB SERPL-MCNC: 0.7 MG/DL (ref 0–1.2)
BILIRUB UR QL STRIP: NEGATIVE
BUN SERPL-MCNC: 19 MG/DL (ref 8–27)
BUN/CREAT SERPL: 18 (ref 10–24)
CALCIUM SERPL-MCNC: 9.8 MG/DL (ref 8.6–10.2)
CASTS URNS QL MICRO: NORMAL /LPF
CHLORIDE SERPL-SCNC: 102 MMOL/L (ref 96–106)
CHOLEST SERPL-MCNC: 187 MG/DL (ref 100–199)
CO2 SERPL-SCNC: 25 MMOL/L (ref 20–29)
COLOR UR: YELLOW
CREAT SERPL-MCNC: 1.07 MG/DL (ref 0.76–1.27)
CREAT UR-MCNC: 108.3 MG/DL
EGFR: 79 ML/MIN/1.73
EOSINOPHIL # BLD AUTO: 0.1 X10E3/UL (ref 0–0.4)
EOSINOPHIL NFR BLD AUTO: 1 %
EPI CELLS #/AREA URNS HPF: NORMAL /HPF (ref 0–10)
ERYTHROCYTE [DISTWIDTH] IN BLOOD BY AUTOMATED COUNT: 13.1 % (ref 11.6–15.4)
GLOBULIN SER-MCNC: 1.8 G/DL (ref 1.5–4.5)
GLUCOSE SERPL-MCNC: 275 MG/DL (ref 70–99)
GLUCOSE UR QL: ABNORMAL
HBA1C MFR BLD: 10.3 % (ref 4.8–5.6)
HCT VFR BLD AUTO: 48 % (ref 37.5–51)
HDLC SERPL-MCNC: 31 MG/DL
HGB BLD-MCNC: 16 G/DL (ref 13–17.7)
HGB UR QL STRIP: NEGATIVE
IMM GRANULOCYTES # BLD: 0 X10E3/UL (ref 0–0.1)
IMM GRANULOCYTES NFR BLD: 0 %
KETONES UR QL STRIP: ABNORMAL
LDLC SERPL CALC-MCNC: 96 MG/DL (ref 0–99)
LEUKOCYTE ESTERASE UR QL STRIP: NEGATIVE
LYMPHOCYTES # BLD AUTO: 0.9 X10E3/UL (ref 0.7–3.1)
LYMPHOCYTES NFR BLD AUTO: 16 %
MCH RBC QN AUTO: 28.2 PG (ref 26.6–33)
MCHC RBC AUTO-ENTMCNC: 33.3 G/DL (ref 31.5–35.7)
MCV RBC AUTO: 85 FL (ref 79–97)
MICRO URNS: ABNORMAL
MICRO URNS: ABNORMAL
MICROALBUMIN UR-MCNC: 17.1 UG/ML
MONOCYTES # BLD AUTO: 0.6 X10E3/UL (ref 0.1–0.9)
MONOCYTES NFR BLD AUTO: 10 %
NEUTROPHILS # BLD AUTO: 4.2 X10E3/UL (ref 1.4–7)
NEUTROPHILS NFR BLD AUTO: 73 %
NITRITE UR QL STRIP: NEGATIVE
PH UR STRIP: 6.5 [PH] (ref 5–7.5)
PLATELET # BLD AUTO: 148 X10E3/UL (ref 150–450)
POTASSIUM SERPL-SCNC: 5.6 MMOL/L (ref 3.5–5.2)
PROT SERPL-MCNC: 6.6 G/DL (ref 6–8.5)
PROT UR QL STRIP: NEGATIVE
PSA FREE MFR SERPL: 15.1 %
PSA FREE SERPL-MCNC: 0.86 NG/ML
PSA SERPL-MCNC: 5.7 NG/ML (ref 0–4)
RBC # BLD AUTO: 5.67 X10E6/UL (ref 4.14–5.8)
RBC #/AREA URNS HPF: NORMAL /HPF (ref 0–2)
SL AMB VLDL CHOLESTEROL CALC: 60 MG/DL (ref 5–40)
SODIUM SERPL-SCNC: 141 MMOL/L (ref 134–144)
SP GR UR: 1.03 (ref 1–1.03)
TRIGL SERPL-MCNC: 356 MG/DL (ref 0–149)
TSH SERPL DL<=0.005 MIU/L-ACNC: 2.48 UIU/ML (ref 0.45–4.5)
UROBILINOGEN UR STRIP-ACNC: 0.2 MG/DL (ref 0.2–1)
WBC # BLD AUTO: 5.8 X10E3/UL (ref 3.4–10.8)
WBC #/AREA URNS HPF: NORMAL /HPF (ref 0–5)

## 2024-03-25 LAB — COLOGUARD RESULT REPORTABLE: NEGATIVE

## 2024-04-03 NOTE — PROGRESS NOTES
Office Visit Note  24     Bon Snyder 63 y.o. male MRN: [id number]  : 1960    Assessment:     1. Benign prostatic hyperplasia with incomplete bladder emptying  Assessment & Plan:  Patient has responded well to the tamsulosin less frequency with urination and urgency however patient now with elevated blood sugars with sugar in the urine may be a contributory factor.  Will have him seen once by the urologist also      2. Primary hypertension  Assessment & Plan:  Blood pressure today 138/79 will start him on lisinopril after getting the repeat labs to make sure the potassium level is all right    Orders:  -     Ambulatory referral to Diabetic Education - use to refer for diabetes group classes, individual diabetes education, medical nutrition therapy, device training; Future; Expected date: 2024    3. Type 2 diabetes mellitus without complication, without long-term current use of insulin ([facility])  Assessment & Plan:    Lab Results   Component Value Date    HGBA1C 10.3 (H) 2024   New onset diabetes mellitus family history of diabetes strict diet and dietary referral start him on metformin 500 mg 2 tablets daily recheck in 10 days time later on will also get the Accu-Chek machine and readings to be done at home    Orders:  -     Ambulatory referral to Diabetic Education - use to refer for diabetes group classes, individual diabetes education, medical nutrition therapy, device training; Future; Expected date: 2024  -     metFORMIN (GLUCOPHAGE-XR) 500 mg 24 hr tablet; Take 2 tablets (1,000 mg total) by mouth daily with breakfast  -     Basic metabolic panel; Future; Expected date: 2024  -     Albumin / creatinine urine ratio; Future; Expected date: 2024  -     Basic metabolic panel  -     Albumin / creatinine urine ratio    4. Elevated PSA  Assessment & Plan:  Patient with elevated PSA of 5.7 will have him seen once by the urologist also follow-up    Orders:  -     Ambulatory  referral to Urology; Future    5. Dyslipidemia  Assessment & Plan:  Triglycerides are coming up I emphasized regarding diet exercise avoid cheese butter see the dietitian will start him on medication also I am going to see him in 10 days time    Orders:  -     Ambulatory referral to Diabetic Education - use to refer for diabetes group classes, individual diabetes education, medical nutrition therapy, device training; Future; Expected date: 04/04/2024    6. Hyperkalemia  Assessment & Plan:  Patient potassium level came back at 5.6 he admits to eating a lot of bananas and orange juice drinking recommend stop it we will follow-up with repeat lab to check the potassium level                 Discussion Summary and Plan:  Today's care plan and medications were reviewed with patient in detail and all their questions answered to their satisfaction.    Chief Complaint   Patient presents with    Follow-up      Subjective:  Patient is coming here for evaluation regarding increased frequency with urination along with urgency have seen the patient about the month ago started him on tamsulosin to which she has responded feeling much better compared to before.  However his lab work has become come back abnormal his hemoglobin A1c is 10.3 blood sugar is high potassium 5.6 but patient admits to eating a lot of bananas and not drinking orange juice.  Medications at present time none except for tamsulosin which I prescribed the last time.  Patient's Cologuard test came back negative        The following portions of the patient's history were reviewed and updated as appropriate: allergies, current medications, past family history, past medical history, past social history, past surgical history and problem list.    Review of Systems   Constitutional:  Negative for chills and fever.   HENT:  Negative for ear pain and sore throat.    Eyes:  Negative for pain and visual disturbance.   Respiratory:  Negative for cough and shortness of  "breath.    Cardiovascular:  Negative for chest pain and palpitations.   Gastrointestinal:  Negative for abdominal pain and vomiting.   Genitourinary:  Negative for dysuria and hematuria.   Musculoskeletal:  Negative for arthralgias and back pain.   Skin:  Negative for color change and rash.   Neurological:  Negative for seizures and syncope.   All other systems reviewed and are negative.        Historical Information   Patient Active Problem List   Diagnosis    Benign prostatic hyperplasia with incomplete bladder emptying    Primary hypertension    Type 2 diabetes mellitus without complication, without long-term current use of insulin ([facility]CC)    Dyslipidemia    Hyperkalemia    Elevated PSA     Past Medical History:   Diagnosis Date    Gout      Past Surgical History:   Procedure Laterality Date    NO PAST SURGERIES       Social History     Substance and Sexual Activity   Alcohol Use Yes     Social History     Substance and Sexual Activity   Drug Use No     Social History     Tobacco Use   Smoking Status Never   Smokeless Tobacco Never     History reviewed. No pertinent family history.  Health Maintenance Due   Topic    Hepatitis C Screening     Diabetic Foot Exam     DM Eye Exam     HIV Screening     Annual Physical     Zoster Vaccine (1 of 2)    Influenza Vaccine (1)    COVID-19 Vaccine (1 - 2023-24 season)      Meds/Allergies       Current Outpatient Medications:     metFORMIN (GLUCOPHAGE-XR) 500 mg 24 hr tablet, Take 2 tablets (1,000 mg total) by mouth daily with breakfast, Disp: 180 tablet, Rfl: 1    tamsulosin (FLOMAX) 0.4 mg, Take 1 capsule (0.4 mg total) by mouth daily with dinner, Disp: 30 capsule, Rfl: 1      Objective:    Vitals:   /79 (BP Location: Right arm, Patient Position: Sitting, Cuff Size: Standard)   Pulse (!) 115   Ht 5' 11\" (1.803 m)   Wt 91.8 kg (202 lb 6.4 oz)   SpO2 98%   BMI 28.23 kg/m²   Body mass index is 28.23 kg/m².  Vitals:    04/04/24 1630   Weight: 91.8 kg (202 lb 6.4 oz) "       Physical Exam  Vitals and nursing note reviewed.   Constitutional:       Appearance: Normal appearance.   Cardiovascular:      Rate and Rhythm: Normal rate and regular rhythm.      Heart sounds: Normal heart sounds.   Pulmonary:      Effort: Pulmonary effort is normal.      Breath sounds: Normal breath sounds.   Abdominal:      Palpations: Abdomen is soft.   Musculoskeletal:         General: Normal range of motion.      Right lower leg: No edema.      Left lower leg: No edema.   Skin:     General: Skin is warm and dry.   Neurological:      General: No focal deficit present.      Mental Status: He is alert and oriented to person, place, and time.   Psychiatric:         Mood and Affect: Mood normal.         Behavior: Behavior normal.         Lab Review   Orders Only on 03/08/2024   Component Date Value Ref Range Status    White Blood Cell Count 03/08/2024 5.8  3.4 - 10.8 x10E3/uL Final    Red Blood Cell Count 03/08/2024 5.67  4.14 - 5.80 x10E6/uL Final    Hemoglobin 03/08/2024 16.0  13.0 - 17.7 g/dL Final    HCT 03/08/2024 48.0  37.5 - 51.0 % Final    MCV 03/08/2024 85  79 - 97 fL Final    MCH 03/08/2024 28.2  26.6 - 33.0 pg Final    MCHC 03/08/2024 33.3  31.5 - 35.7 g/dL Final    RDW 03/08/2024 13.1  11.6 - 15.4 % Final    Platelet Count 03/08/2024 148 (L)  150 - 450 x10E3/uL Final    Neutrophils 03/08/2024 73  Not Estab. % Final    Lymphocytes 03/08/2024 16  Not Estab. % Final    Monocytes 03/08/2024 10  Not Estab. % Final    Eosinophils 03/08/2024 1  Not Estab. % Final    Basophils PCT 03/08/2024 0  Not Estab. % Final    Neutrophils (Absolute) 03/08/2024 4.2  1.4 - 7.0 x10E3/uL Final    Lymphocytes (Absolute) 03/08/2024 0.9  0.7 - 3.1 x10E3/uL Final    Monocytes (Absolute) 03/08/2024 0.6  0.1 - 0.9 x10E3/uL Final    Eosinophils (Absolute) 03/08/2024 0.1  0.0 - 0.4 x10E3/uL Final    Basophils ABS 03/08/2024 0.0  0.0 - 0.2 x10E3/uL Final    Immature Granulocytes 03/08/2024 0  Not Estab. % Final    Immature  Granulocytes (Absolute) 03/08/2024 0.0  0.0 - 0.1 x10E3/uL Final    Comment: A hand-written panel/profile was received from your office. In  accordance with the [facility] Ambiguous Test Code Policy dated July 2003, we have assigned CBC with Differential/Platelet, Test Code  #961920 to this request. If this is not the testing you wished to  receive on this specimen, please contact the [facility] Client Inquiry/  Technical Services Department to clarify the test order. We  appreciate your business.      Glucose, Random 03/08/2024 275 (H)  70 - 99 mg/dL Final    BUN 03/08/2024 19  8 - 27 mg/dL Final    Creatinine 03/08/2024 1.07  0.76 - 1.27 mg/dL Final    eGFR 03/08/2024 79  >59 mL/min/1.73 Final    SL AMB BUN/CREATININE RATIO 03/08/2024 18  10 - 24 Final    Sodium 03/08/2024 141  134 - 144 mmol/L Final    Potassium 03/08/2024 5.6 (H)  3.5 - 5.2 mmol/L Final    Chloride 03/08/2024 102  96 - 106 mmol/L Final    CO2 03/08/2024 25  20 - 29 mmol/L Final    CALCIUM 03/08/2024 9.8  8.6 - 10.2 mg/dL Final    Protein, Total 03/08/2024 6.6  6.0 - 8.5 g/dL Final    Albumin 03/08/2024 4.8  3.9 - 4.9 g/dL Final    Globulin, Total 03/08/2024 1.8  1.5 - 4.5 g/dL Final    Albumin/Globulin Ratio 03/08/2024 2.7 (H)  1.2 - 2.2 Final    TOTAL BILIRUBIN 03/08/2024 0.7  0.0 - 1.2 mg/dL Final    Alk Phos Isoenzymes 03/08/2024 83  44 - 121 IU/L Final    AST 03/08/2024 21  0 - 40 IU/L Final    ALT 03/08/2024 34  0 - 44 IU/L Final    Specific Gravity 03/08/2024 1.029  1.005 - 1.030 Final    Ph 03/08/2024 6.5  5.0 - 7.5 Final    Color UA 03/08/2024 Yellow  Yellow Final    Urine Appearance 03/08/2024 Clear  Clear Final    Leukocyte Esterase 03/08/2024 Negative  Negative Final    Protein 03/08/2024 Negative  Negative/Trace Final    Glucose, 24 HR Urine 03/08/2024 3+ (A)  Negative Final    Ketone, Urine 03/08/2024 Trace (A)  Negative Final    Blood, Urine 03/08/2024 Negative  Negative Final    Bilirubin, Urine 03/08/2024 Negative  Negative Final     Urobilinogen Urine 03/08/2024 0.2  0.2 - 1.0 mg/dL Final    Nitrites Urine 03/08/2024 Negative  Negative Final    Microscopic Examination 03/08/2024 Comment   Final    Microscopic follows if indicated.    Microscopic Examination 03/08/2024 See below:   Final    Microscopic was indicated and was performed.    SL AMB WBC, URINE 03/08/2024 None seen  0 - 5 /hpf Final    RBC, Urine 03/08/2024 None seen  0 - 2 /hpf Final    Epithelial Cells (non renal) 03/08/2024 None seen  0 - 10 /hpf Final    Casts 03/08/2024 None seen  None seen /lpf Final    Bacteria, Urine 03/08/2024 None seen  None seen/Few Final    Cholesterol, Total 03/08/2024 187  100 - 199 mg/dL Final    Triglycerides 03/08/2024 356 (H)  0 - 149 mg/dL Final    HDL 03/08/2024 31 (L)  >39 mg/dL Final    VLDL Cholesterol Calculated 03/08/2024 60 (H)  5 - 40 mg/dL Final    LDL Calculated 03/08/2024 96  0 - 99 mg/dL Final    Creatinine, Urine 03/08/2024 108.3  Not Estab. mg/dL Final    Albumin,U,Random 03/08/2024 17.1  Not Estab. ug/mL Final    Microalb/Creat Ratio 03/08/2024 16  0 - 29 mg/g creat Final    Comment:                        Normal:                0 -  29                         Moderately increased: 30 - 300                         Severely increased:       >300      Prostate Specific Antigen Total 03/08/2024 5.7 (H)  0.0 - 4.0 ng/mL Final    Comment: Roche ECLIA methodology.  According to the American Urological Association, Serum PSA should  decrease and remain at undetectable levels after radical  prostatectomy. The AUA defines biochemical recurrence as an initial  PSA value 0.2 ng/mL or greater followed by a subsequent confirmatory  PSA value 0.2 ng/mL or greater.  Values obtained with different assay methods or kits cannot be used  interchangeably. Results cannot be interpreted as absolute evidence  of the presence or absence of malignant disease.      PSA, Free 03/08/2024 0.86  N/A ng/mL Final    Roche ECLIA methodology.    PSA, Free Pct  03/08/2024 15.1  % Final    Comment: The table below lists the probability of prostate cancer for  men with non-suspicious CHRISTINA results and total PSA between  4 and 10 ng/mL, by patient age (Maria Eugenia et al, JUSTINE 1998,  279:1542).                    % Free PSA       50-64 yr        65-75 yr                    0.00-10.00%        56%             55%                   10.01-15.00%        24%             35%                   15.01-20.00%        17%             23%                   20.01-25.00%        10%             20%                        >25.00%         5%              9%  Please note:  Maria Eugenia et al did not make specific                recommendations regarding the use of                percent free PSA for any other population                of men.      Hemoglobin A1C 03/08/2024 10.3 (H)  4.8 - 5.6 % Final    Comment:          Prediabetes: 5.7 - 6.4           Diabetes: >6.4           Glycemic control for adults with diabetes: <7.0      TSH 03/08/2024 2.480  0.450 - 4.500 uIU/mL Final   Office Visit on 03/07/2024   Component Date Value Ref Range Status    Cologuard Result 03/20/2024 Negative  Negative Final    Comment: NEGATIVE TEST RESULT. A negative Cologuard result indicates a low likelihood that a colorectal cancer (CRC) or advanced adenoma (adenomatous polyps with more advanced pre-malignant features)  is present. The chance that a person with a negative Cologuard test has a colorectal cancer is less than 1 in 1500 (negative predictive value >99.9%) or has an  advanced adenoma is less than  5.3% (negative predictive value 94.7%). These data are based on a prospective cross-sectional study of 10,000 individuals at average risk for colorectal cancer who were screened with both Cologuard and colonoscopy. (Dwayne Quintanilla al, N Engl J Med 2014;370(14):1662-8245) The normal value (reference range) for this assay is negative.  COLOGUARD RE-SCREENING RECOMMENDATION: Periodic colorectal cancer screening is an  important part of preventive healthcare for asymptomatic individuals at average risk for colorectal cancer.  Following a negative Cologuard result, the American Cancer Society and U.S. Multi-Societ                           y Task Force screening guidelines recommend a Cologuard re-screening interval of 3 years.   References: American Cancer Society Guideline for Colorectal Cancer Screening: https://www.cancer.org/cancer/colon-rectal-cancer/puwvpejiy-ngvgtyjzy-jpbiruk/acs-recommendations.html.; Nick DK, Jose CR, Suzy BURNSK, Colorectal Cancer Screening: Recommendations for Physicians and Patients from the U.S. Multi-Society Task Force on Colorectal Cancer Screening , Am J Gastroenterology 2017; 112:0767-7403.  TEST DESCRIPTION: Composite algorithmic analysis of stool DNA-biomarkers with hemoglobin immunoassay.   Quantitative values of individual biomarkers are not reportable and are not associated with individual biomarker result reference ranges. Cologuard is intended for colorectal cancer screening of adults of either sex, 45 years or older, who are at average-risk for colorectal cancer (CRC). Cologuard has been approved for use by the U.S. FDA. The performance of Cologuard was established in a cross s                           ectional study of average-risk adults aged 50-84. Cologuard performance in patients ages 45 to 49 years was estimated by sub-group analysis of near-age groups. Colonoscopies performed for a positive result may find as the most clinically significant lesion: colorectal cancer [4.0%], advanced adenoma (including sessile serrated polyps greater than or equal to 1cm diameter) [20%] or non- advanced adenoma [31%]; or no colorectal neoplasia [45%]. These estimates are derived from a prospective cross-sectional screening study of 10,000 individuals at average risk for colorectal cancer who were screened with both Cologuard and colonoscopy. (Dwayne Quintanilla al, N Engl J Med  "2014;370(46):7226-4464.) Cologuard may produce a false negative or false positive result (no colorectal cancer or precancerous polyp present at colonoscopy follow up). A negative Cologuard test result does not guarantee the absence of CRC or advanced adenoma (pre-cancer). The current Cologuard screening interval is every 3                            years. (American Cancer Society and U.S. Multi-Society Task Force). Cologuard performance data in a 10,000 patient pivotal study using colonoscopy as the reference method can be accessed at the following location: www.[employer]/results. Additional description of the Cologuard test process, warnings and precautions can be found at www.cologuard.com.           Monty Mitchell MD        \"This note has been constructed using a voice recognition system.Therefore there may be syntax, spelling, and/or grammatical errors. Please call if you have any questions. \"  "

## 2024-04-04 ENCOUNTER — OFFICE VISIT (OUTPATIENT)
Dept: FAMILY MEDICINE CLINIC | Facility: CLINIC | Age: 64
End: 2024-04-04
Payer: COMMERCIAL

## 2024-04-04 VITALS
OXYGEN SATURATION: 98 % | HEART RATE: 115 BPM | DIASTOLIC BLOOD PRESSURE: 79 MMHG | SYSTOLIC BLOOD PRESSURE: 138 MMHG | WEIGHT: 202.4 LBS | HEIGHT: 71 IN | BODY MASS INDEX: 28.34 KG/M2

## 2024-04-04 DIAGNOSIS — E78.5 DYSLIPIDEMIA: ICD-10-CM

## 2024-04-04 DIAGNOSIS — E87.5 HYPERKALEMIA: ICD-10-CM

## 2024-04-04 DIAGNOSIS — R39.14 BENIGN PROSTATIC HYPERPLASIA WITH INCOMPLETE BLADDER EMPTYING: ICD-10-CM

## 2024-04-04 DIAGNOSIS — E11.9 TYPE 2 DIABETES MELLITUS WITHOUT COMPLICATION, WITHOUT LONG-TERM CURRENT USE OF INSULIN (HCC): Primary | ICD-10-CM

## 2024-04-04 DIAGNOSIS — I10 PRIMARY HYPERTENSION: ICD-10-CM

## 2024-04-04 DIAGNOSIS — N40.1 BENIGN PROSTATIC HYPERPLASIA WITH INCOMPLETE BLADDER EMPTYING: ICD-10-CM

## 2024-04-04 DIAGNOSIS — R97.20 ELEVATED PSA: ICD-10-CM

## 2024-04-04 PROCEDURE — 99214 OFFICE O/P EST MOD 30 MIN: CPT | Performed by: INTERNAL MEDICINE

## 2024-04-04 RX ORDER — METFORMIN HYDROCHLORIDE 500 MG/1
1000 TABLET, EXTENDED RELEASE ORAL
Qty: 180 TABLET | Refills: 1 | Status: SHIPPED | OUTPATIENT
Start: 2024-04-04

## 2024-04-04 NOTE — ASSESSMENT & PLAN NOTE
Patient has responded well to the tamsulosin less frequency with urination and urgency however patient now with elevated blood sugars with sugar in the urine may be a contributory factor.  Will have him seen once by the urologist also

## 2024-04-04 NOTE — ASSESSMENT & PLAN NOTE
Patient potassium level came back at 5.6 he admits to eating a lot of bananas and orange juice drinking recommend stop it we will follow-up with repeat lab to check the potassium level

## 2024-04-04 NOTE — ASSESSMENT & PLAN NOTE
Triglycerides are coming up I emphasized regarding diet exercise avoid cheese butter see the dietitian will start him on medication also I am going to see him in 10 days time

## 2024-04-04 NOTE — ASSESSMENT & PLAN NOTE
Blood pressure today 138/79 will start him on lisinopril after getting the repeat labs to make sure the potassium level is all right

## 2024-04-04 NOTE — ASSESSMENT & PLAN NOTE
Lab Results   Component Value Date    HGBA1C 10.3 (H) 03/08/2024   New onset diabetes mellitus family history of diabetes strict diet and dietary referral start him on metformin 500 mg 2 tablets daily recheck in 10 days time later on will also get the Accu-Chek machine and readings to be done at home   Medication:   Requested Prescriptions     Pending Prescriptions Disp Refills    fluticasone-salmeterol (ADVAIR) 100-50 MCG/ACT AEPB diskus inhaler [Pharmacy Med Name: FLUTICASONE-SALMETEROL 100-50]       Sig: INHALE ONE PUFF BY MOUTH EVERY MORNING AND INHALE ONE PUFF BY MOUTH EVERY EVENING        Last Filled:  12/14/2022 60 each 3 refills     Patient Phone Number: [phone] (home)     Last appt: 8/8/2022   Next appt: Visit date not found    Last OARRS: No flowsheet data found.

## 2024-04-06 LAB
ALBUMIN/CREAT UR: 21 MG/G CREAT (ref 0–29)
BUN SERPL-MCNC: 22 MG/DL (ref 8–27)
BUN/CREAT SERPL: 20 (ref 10–24)
CALCIUM SERPL-MCNC: 9.9 MG/DL (ref 8.6–10.2)
CHLORIDE SERPL-SCNC: 100 MMOL/L (ref 96–106)
CO2 SERPL-SCNC: 23 MMOL/L (ref 20–29)
CREAT SERPL-MCNC: 1.1 MG/DL (ref 0.76–1.27)
CREAT UR-MCNC: 123.2 MG/DL
EGFR: 76 ML/MIN/1.73
GLUCOSE SERPL-MCNC: 326 MG/DL (ref 70–99)
MICROALBUMIN UR-MCNC: 26.2 UG/ML
POTASSIUM SERPL-SCNC: 5.1 MMOL/L (ref 3.5–5.2)
SODIUM SERPL-SCNC: 142 MMOL/L (ref 134–144)

## 2024-04-09 NOTE — PROGRESS NOTES
Office Visit Note  24     Bon Snyder 63 y.o. male MRN: [id number]  : 1960    Assessment:     1. Primary hypertension  Assessment & Plan:  Blood pressure today 138/80 will start the patient on lisinopril 10 mg daily which can help protect the kidneys as well as control of blood pressure.    Orders:  -     lisinopril (ZESTRIL) 10 mg tablet; Take 1 tablet (10 mg total) by mouth daily    2. Type 2 diabetes mellitus without complication, without long-term current use of insulin ([facility])  Assessment & Plan:    Lab Results   Component Value Date    HGBA1C 10.3 (H) 2024   And is tolerating the metformin very well continue the same for now later on will be adding another medication depending on the response and after seeing the dietitian.      3. Benign prostatic hyperplasia with incomplete bladder emptying  Assessment & Plan:  Patient responded well to the tamsulosin decrease in frequency urgency with urination however his blood sugar elevation also was a contributing factor we will continue with the tamsulosin follow-up with the urologist since PSA came up slightly high.      4. Dyslipidemia  Assessment & Plan:  Mild elevation in triglycerides again emphasized regarding diet exercise which she is following starting later on anticholesterol medication also which can help controlling the cholesterol as well as triglycerides      5. Elevated PSA  Assessment & Plan:  Follow-up with a urologist      6. Hyperkalemia  Assessment & Plan:  Repeat potential level came back normal patient has stopped eating bananas and stop drinking orange juice we will continue to monitor closely.                 Discussion Summary and Plan:  Today's care plan and medications were reviewed with patient in detail and all their questions answered to their satisfaction.    Chief Complaint   Patient presents with   • Follow-up      Subjective:  Patient is coming here for a follow-up evaluation with regards to symptoms of diabetes,  BPH, elevated PSA levels.  Patient has an appointment with the dietitian on the 19th of this month he is also making an appointment with the urologist.  Patient is feeling much better compared to before less frequency with urination in general also is feeling good.  Medications reviewed currently he is on metformin and tamsulosin tolerating it very well.  Patient blood pressures running on the higher side we will start him on lisinopril which also helps with his blood pressure and protecting the kidneys.        The following portions of the patient's history were reviewed and updated as appropriate: allergies, current medications, past family history, past medical history, past social history, past surgical history and problem list.    Review of Systems   Constitutional:  Negative for chills and fever.   HENT:  Negative for ear pain and sore throat.    Eyes:  Negative for pain and visual disturbance.   Respiratory:  Negative for cough and shortness of breath.    Cardiovascular:  Negative for chest pain and palpitations.   Gastrointestinal:  Negative for abdominal pain and vomiting.   Genitourinary:  Negative for dysuria and hematuria.   Musculoskeletal:  Negative for arthralgias and back pain.   Skin:  Negative for color change and rash.   Neurological:  Negative for seizures and syncope.   All other systems reviewed and are negative.        Historical Information   Patient Active Problem List   Diagnosis   • Benign prostatic hyperplasia with incomplete bladder emptying   • Primary hypertension   • Type 2 diabetes mellitus without complication, without long-term current use of insulin (HCC)   • Dyslipidemia   • Hyperkalemia   • Elevated PSA     Past Medical History:   Diagnosis Date   • Gout      Past Surgical History:   Procedure Laterality Date   • NO PAST SURGERIES       Social History     Substance and Sexual Activity   Alcohol Use Yes     Social History     Substance and Sexual Activity   Drug Use No     Social  "History     Tobacco Use   Smoking Status Never   Smokeless Tobacco Never     History reviewed. No pertinent family history.  Health Maintenance Due   Topic   • Hepatitis C Screening    • Pneumococcal Vaccine: Pediatrics (0 to 5 Years) and At-Risk Patients (6 to 64 Years) (1 of 2 - PCV)   • Diabetic Foot Exam    • DM Eye Exam    • HIV Screening    • Annual Physical    • Zoster Vaccine (1 of 2)   • Influenza Vaccine (1)   • COVID-19 Vaccine (1 - 2023-24 season)      Meds/Allergies       Current Outpatient Medications:   •  lisinopril (ZESTRIL) 10 mg tablet, Take 1 tablet (10 mg total) by mouth daily, Disp: 30 tablet, Rfl: 1  •  metFORMIN (GLUCOPHAGE-XR) 500 mg 24 hr tablet, Take 2 tablets (1,000 mg total) by mouth daily with breakfast, Disp: 180 tablet, Rfl: 1  •  tamsulosin (FLOMAX) 0.4 mg, Take 1 capsule (0.4 mg total) by mouth daily with dinner, Disp: 30 capsule, Rfl: 1      Objective:    Vitals:   /80 (BP Location: Right arm, Patient Position: Sitting, Cuff Size: Standard)   Pulse (!) 120   Ht 5' 11\" (1.803 m)   Wt 92 kg (202 lb 12.8 oz)   SpO2 98%   BMI 28.28 kg/m²   Body mass index is 28.28 kg/m².  Vitals:    04/11/24 1602   Weight: 92 kg (202 lb 12.8 oz)       Physical Exam  Vitals and nursing note reviewed.   Constitutional:       Appearance: Normal appearance.   Cardiovascular:      Rate and Rhythm: Normal rate and regular rhythm.      Pulses: no weak pulses.           Dorsalis pedis pulses are 1+ on the right side and 1+ on the left side.        Posterior tibial pulses are 1+ on the right side and 1+ on the left side.      Heart sounds: Normal heart sounds.   Pulmonary:      Effort: Pulmonary effort is normal.      Breath sounds: Normal breath sounds.   Abdominal:      General: Abdomen is flat.      Palpations: Abdomen is soft.   Musculoskeletal:         General: Normal range of motion.      Right lower leg: No edema.      Left lower leg: No edema.   Feet:      Right foot:      Skin integrity: No " ulcer, skin breakdown, erythema, warmth, callus or dry skin.      Left foot:      Skin integrity: No ulcer, skin breakdown, erythema, warmth, callus or dry skin.   Skin:     General: Skin is warm and dry.   Neurological:      General: No focal deficit present.      Mental Status: He is alert and oriented to person, place, and time.   Psychiatric:         Mood and Affect: Mood normal.         Behavior: Behavior normal.       Diabetic Foot Exam    Patient's shoes and socks removed.    Right Foot/Ankle   Right Foot Inspection  Skin Exam: skin normal and skin intact. No dry skin, no warmth, no callus, no erythema, no maceration, no abnormal color, no pre-ulcer, no ulcer and no callus.     Toe Exam: ROM and strength within normal limits.     Sensory   Monofilament testing: intact    Vascular  The right DP pulse is 1+. The right PT pulse is 1+.     Left Foot/Ankle  Left Foot Inspection  Skin Exam: skin normal and skin intact. No dry skin, no warmth, no erythema, no maceration, normal color, no pre-ulcer, no ulcer and no callus.     Toe Exam: ROM and strength within normal limits.     Sensory   Monofilament testing: intact    Vascular  The left DP pulse is 1+. The left PT pulse is 1+.     Assign Risk Category  No deformity present  No loss of protective sensation  No weak pulses  Risk: 0      Lab Review   Office Visit on 04/04/2024   Component Date Value Ref Range Status   • Glucose, Random 04/05/2024 326 (H)  70 - 99 mg/dL Final   • BUN 04/05/2024 22  8 - 27 mg/dL Final   • Creatinine 04/05/2024 1.10  0.76 - 1.27 mg/dL Final   • eGFR 04/05/2024 76  >59 mL/min/1.73 Final   • SL AMB BUN/CREATININE RATIO 04/05/2024 20  10 - 24 Final   • Sodium 04/05/2024 142  134 - 144 mmol/L Final   • Potassium 04/05/2024 5.1  3.5 - 5.2 mmol/L Final   • Chloride 04/05/2024 100  96 - 106 mmol/L Final   • CO2 04/05/2024 23  20 - 29 mmol/L Final   • CALCIUM 04/05/2024 9.9  8.6 - 10.2 mg/dL Final   • Creatinine, Urine 04/05/2024 123.2  Not  Estab. mg/dL Final   • Albumin,U,Random 04/05/2024 26.2  Not Estab. ug/mL Final   • Microalb/Creat Ratio 04/05/2024 21  0 - 29 mg/g creat Final    Comment:                        Normal:                0 -  29                         Moderately increased: 30 - 300                         Severely increased:       >300     Orders Only on 03/08/2024   Component Date Value Ref Range Status   • White Blood Cell Count 03/08/2024 5.8  3.4 - 10.8 x10E3/uL Final   • Red Blood Cell Count 03/08/2024 5.67  4.14 - 5.80 x10E6/uL Final   • Hemoglobin 03/08/2024 16.0  13.0 - 17.7 g/dL Final   • HCT 03/08/2024 48.0  37.5 - 51.0 % Final   • MCV 03/08/2024 85  79 - 97 fL Final   • MCH 03/08/2024 28.2  26.6 - 33.0 pg Final   • MCHC 03/08/2024 33.3  31.5 - 35.7 g/dL Final   • RDW 03/08/2024 13.1  11.6 - 15.4 % Final   • Platelet Count 03/08/2024 148 (L)  150 - 450 x10E3/uL Final   • Neutrophils 03/08/2024 73  Not Estab. % Final   • Lymphocytes 03/08/2024 16  Not Estab. % Final   • Monocytes 03/08/2024 10  Not Estab. % Final   • Eosinophils 03/08/2024 1  Not Estab. % Final   • Basophils PCT 03/08/2024 0  Not Estab. % Final   • Neutrophils (Absolute) 03/08/2024 4.2  1.4 - 7.0 x10E3/uL Final   • Lymphocytes (Absolute) 03/08/2024 0.9  0.7 - 3.1 x10E3/uL Final   • Monocytes (Absolute) 03/08/2024 0.6  0.1 - 0.9 x10E3/uL Final   • Eosinophils (Absolute) 03/08/2024 0.1  0.0 - 0.4 x10E3/uL Final   • Basophils ABS 03/08/2024 0.0  0.0 - 0.2 x10E3/uL Final   • Immature Granulocytes 03/08/2024 0  Not Estab. % Final   • Immature Granulocytes (Absolute) 03/08/2024 0.0  0.0 - 0.1 x10E3/uL Final    Comment: A hand-written panel/profile was received from your office. In  accordance with the [facility] Ambiguous Test Code Policy dated July 2003, we have assigned CBC with Differential/Platelet, Test Code  #[id number] to this request. If this is not the testing you wished to  receive on this specimen, please contact the [facility] Client Inquiry/  Technical  Services Department to clarify the test order. We  appreciate your business.     • Glucose, Random 03/08/2024 275 (H)  70 - 99 mg/dL Final   • BUN 03/08/2024 19  8 - 27 mg/dL Final   • Creatinine 03/08/2024 1.07  0.76 - 1.27 mg/dL Final   • eGFR 03/08/2024 79  >59 mL/min/1.73 Final   • SL AMB BUN/CREATININE RATIO 03/08/2024 18  10 - 24 Final   • Sodium 03/08/2024 141  134 - 144 mmol/L Final   • Potassium 03/08/2024 5.6 (H)  3.5 - 5.2 mmol/L Final   • Chloride 03/08/2024 102  96 - 106 mmol/L Final   • CO2 03/08/2024 25  20 - 29 mmol/L Final   • CALCIUM 03/08/2024 9.8  8.6 - 10.2 mg/dL Final   • Protein, Total 03/08/2024 6.6  6.0 - 8.5 g/dL Final   • Albumin 03/08/2024 4.8  3.9 - 4.9 g/dL Final   • Globulin, Total 03/08/2024 1.8  1.5 - 4.5 g/dL Final   • Albumin/Globulin Ratio 03/08/2024 2.7 (H)  1.2 - 2.2 Final   • TOTAL BILIRUBIN 03/08/2024 0.7  0.0 - 1.2 mg/dL Final   • Alk Phos Isoenzymes 03/08/2024 83  44 - 121 IU/L Final   • AST 03/08/2024 21  0 - 40 IU/L Final   • ALT 03/08/2024 34  0 - 44 IU/L Final   • Specific Gravity 03/08/2024 1.029  1.005 - 1.030 Final   • Ph 03/08/2024 6.5  5.0 - 7.5 Final   • Color UA 03/08/2024 Yellow  Yellow Final   • Urine Appearance 03/08/2024 Clear  Clear Final   • Leukocyte Esterase 03/08/2024 Negative  Negative Final   • Protein 03/08/2024 Negative  Negative/Trace Final   • Glucose, 24 HR Urine 03/08/2024 3+ (A)  Negative Final   • Ketone, Urine 03/08/2024 Trace (A)  Negative Final   • Blood, Urine 03/08/2024 Negative  Negative Final   • Bilirubin, Urine 03/08/2024 Negative  Negative Final   • Urobilinogen Urine 03/08/2024 0.2  0.2 - 1.0 mg/dL Final   • Nitrites Urine 03/08/2024 Negative  Negative Final   • Microscopic Examination 03/08/2024 Comment   Final    Microscopic follows if indicated.   • Microscopic Examination 03/08/2024 See below:   Final    Microscopic was indicated and was performed.   • SL AMB WBC, URINE 03/08/2024 None seen  0 - 5 /hpf Final   • RBC, Urine  03/08/2024 None seen  0 - 2 /hpf Final   • Epithelial Cells (non renal) 03/08/2024 None seen  0 - 10 /hpf Final   • Casts 03/08/2024 None seen  None seen /lpf Final   • Bacteria, Urine 03/08/2024 None seen  None seen/Few Final   • Cholesterol, Total 03/08/2024 187  100 - 199 mg/dL Final   • Triglycerides 03/08/2024 356 (H)  0 - 149 mg/dL Final   • HDL 03/08/2024 31 (L)  >39 mg/dL Final   • VLDL Cholesterol Calculated 03/08/2024 60 (H)  5 - 40 mg/dL Final   • LDL Calculated 03/08/2024 96  0 - 99 mg/dL Final   • Creatinine, Urine 03/08/2024 108.3  Not Estab. mg/dL Final   • Albumin,U,Random 03/08/2024 17.1  Not Estab. ug/mL Final   • Microalb/Creat Ratio 03/08/2024 16  0 - 29 mg/g creat Final    Comment:                        Normal:                0 -  29                         Moderately increased: 30 - 300                         Severely increased:       >300     • Prostate Specific Antigen Total 03/08/2024 5.7 (H)  0.0 - 4.0 ng/mL Final    Comment: Roche ECLIA methodology.  According to the American Urological Association, Serum PSA should  decrease and remain at undetectable levels after radical  prostatectomy. The AUA defines biochemical recurrence as an initial  PSA value 0.2 ng/mL or greater followed by a subsequent confirmatory  PSA value 0.2 ng/mL or greater.  Values obtained with different assay methods or kits cannot be used  interchangeably. Results cannot be interpreted as absolute evidence  of the presence or absence of malignant disease.     • PSA, Free 03/08/2024 0.86  N/A ng/mL Final    Roche ECLIA methodology.   • PSA, Free Pct 03/08/2024 15.1  % Final    Comment: The table below lists the probability of prostate cancer for  men with non-suspicious CHRISTINA results and total PSA between  4 and 10 ng/mL, by patient age (Maria Eugenia et al, JUSTINE 1998,  279:1542).                    % Free PSA       50-64 yr        65-75 yr                    0.00-10.00%        56%             55%                    10.01-15.00%        24%             35%                   15.01-20.00%        17%             23%                   20.01-25.00%        10%             20%                        >25.00%         5%              9%  Please note:  Maria Eugenia et al did not make specific                recommendations regarding the use of                percent free PSA for any other population                of men.     • Hemoglobin A1C 03/08/2024 10.3 (H)  4.8 - 5.6 % Final    Comment:          Prediabetes: 5.7 - 6.4           Diabetes: >6.4           Glycemic control for adults with diabetes: <7.0     • TSH 03/08/2024 2.480  0.450 - 4.500 uIU/mL Final   Office Visit on 03/07/2024   Component Date Value Ref Range Status   • Cologuard Result 03/20/2024 Negative  Negative Final    Comment: NEGATIVE TEST RESULT. A negative Cologuard result indicates a low likelihood that a colorectal cancer (CRC) or advanced adenoma (adenomatous polyps with more advanced pre-malignant features)  is present. The chance that a person with a negative Cologuard test has a colorectal cancer is less than 1 in 1500 (negative predictive value >99.9%) or has an  advanced adenoma is less than  5.3% (negative predictive value 94.7%). These data are based on a prospective cross-sectional study of 10,000 individuals at average risk for colorectal cancer who were screened with both Cologuard and colonoscopy. (Dwayne Quintanilla al, N Engl J Med 2014;370(14):4117-6626) The normal value (reference range) for this assay is negative.  COLOGUARD RE-SCREENING RECOMMENDATION: Periodic colorectal cancer screening is an important part of preventive healthcare for asymptomatic individuals at average risk for colorectal cancer.  Following a negative Cologuard result, the American Cancer Society and U.S. Multi-Societ                           y Task Force screening guidelines recommend a Cologuard re-screening interval of 3 years.   References: American Cancer Society Guideline for  Colorectal Cancer Screening: https://www.cancer.org/cancer/colon-rectal-cancer/ufdyfuiiu-tcusqejvf-cvuiopi/acs-recommendations.html.; Nick DK, Jose CR, Suzy BURNSK, Colorectal Cancer Screening: Recommendations for Physicians and Patients from the U.S. Multi-Society Task Force on Colorectal Cancer Screening , Am J Gastroenterology 2017; 112:1048-8983.  TEST DESCRIPTION: Composite algorithmic analysis of stool DNA-biomarkers with hemoglobin immunoassay.   Quantitative values of individual biomarkers are not reportable and are not associated with individual biomarker result reference ranges. Cologuard is intended for colorectal cancer screening of adults of either sex, 45 years or older, who are at average-risk for colorectal cancer (CRC). Cologuard has been approved for use by the U.S. FDA. The performance of Cologuard was established in a cross s                           ectional study of average-risk adults aged 50-84. Cologuard performance in patients ages 45 to 49 years was estimated by sub-group analysis of near-age groups. Colonoscopies performed for a positive result may find as the most clinically significant lesion: colorectal cancer [4.0%], advanced adenoma (including sessile serrated polyps greater than or equal to 1cm diameter) [20%] or non- advanced adenoma [31%]; or no colorectal neoplasia [45%]. These estimates are derived from a prospective cross-sectional screening study of 10,000 individuals at average risk for colorectal cancer who were screened with both Cologuard and colonoscopy. (Dwayne THORNTON et al, N Engl J Med 2014;370(14):5853-7818.) Cologuard may produce a false negative or false positive result (no colorectal cancer or precancerous polyp present at colonoscopy follow up). A negative Cologuard test result does not guarantee the absence of CRC or advanced adenoma (pre-cancer). The current Cologuard screening interval is every 3                            years. (American Cancer Society and  "U.S. Multi-Society Task Force). Cologuard performance data in a 10,000 patient pivotal study using colonoscopy as the reference method can be accessed at the following location: www.Chalkboard/results. Additional description of the Cologuard test process, warnings and precautions can be found at www.cologuard.ShootHome.           Monty Mitchell MD        \"This note has been constructed using a voice recognition system.Therefore there may be syntax, spelling, and/or grammatical errors. Please call if you have any questions. \"  "

## 2024-04-11 ENCOUNTER — OFFICE VISIT (OUTPATIENT)
Dept: FAMILY MEDICINE CLINIC | Facility: CLINIC | Age: 64
End: 2024-04-11
Payer: COMMERCIAL

## 2024-04-11 VITALS
DIASTOLIC BLOOD PRESSURE: 80 MMHG | HEIGHT: 71 IN | WEIGHT: 202.8 LBS | OXYGEN SATURATION: 98 % | BODY MASS INDEX: 28.39 KG/M2 | SYSTOLIC BLOOD PRESSURE: 138 MMHG | HEART RATE: 120 BPM

## 2024-04-11 DIAGNOSIS — E11.9 TYPE 2 DIABETES MELLITUS WITHOUT COMPLICATION, WITHOUT LONG-TERM CURRENT USE OF INSULIN (HCC): ICD-10-CM

## 2024-04-11 DIAGNOSIS — I10 PRIMARY HYPERTENSION: Primary | ICD-10-CM

## 2024-04-11 DIAGNOSIS — R39.14 BENIGN PROSTATIC HYPERPLASIA WITH INCOMPLETE BLADDER EMPTYING: ICD-10-CM

## 2024-04-11 DIAGNOSIS — E87.5 HYPERKALEMIA: ICD-10-CM

## 2024-04-11 DIAGNOSIS — R97.20 ELEVATED PSA: ICD-10-CM

## 2024-04-11 DIAGNOSIS — E78.5 DYSLIPIDEMIA: ICD-10-CM

## 2024-04-11 DIAGNOSIS — N40.1 BENIGN PROSTATIC HYPERPLASIA WITH INCOMPLETE BLADDER EMPTYING: ICD-10-CM

## 2024-04-11 PROCEDURE — 99214 OFFICE O/P EST MOD 30 MIN: CPT | Performed by: INTERNAL MEDICINE

## 2024-04-11 RX ORDER — LISINOPRIL 10 MG/1
10 TABLET ORAL DAILY
Qty: 30 TABLET | Refills: 1 | Status: SHIPPED | OUTPATIENT
Start: 2024-04-11

## 2024-04-11 NOTE — ASSESSMENT & PLAN NOTE
Repeat potential level came back normal patient has stopped eating bananas and stop drinking orange juice we will continue to monitor closely.

## 2024-04-11 NOTE — ASSESSMENT & PLAN NOTE
Mild elevation in triglycerides again emphasized regarding diet exercise which she is following starting later on anticholesterol medication also which can help controlling the cholesterol as well as triglycerides

## 2024-04-11 NOTE — ASSESSMENT & PLAN NOTE
Lab Results   Component Value Date    HGBA1C 10.3 (H) 03/08/2024   And is tolerating the metformin very well continue the same for now later on will be adding another medication depending on the response and after seeing the dietitian.

## 2024-04-11 NOTE — ASSESSMENT & PLAN NOTE
Blood pressure today 138/80 will start the patient on lisinopril 10 mg daily which can help protect the kidneys as well as control of blood pressure.

## 2024-04-11 NOTE — ASSESSMENT & PLAN NOTE
Patient responded well to the tamsulosin decrease in frequency urgency with urination however his blood sugar elevation also was a contributing factor we will continue with the tamsulosin follow-up with the urologist since PSA came up slightly high.   Patient

## 2024-04-19 ENCOUNTER — OFFICE VISIT (OUTPATIENT)
Dept: DIABETES SERVICES | Facility: CLINIC | Age: 64
End: 2024-04-19
Payer: COMMERCIAL

## 2024-04-19 DIAGNOSIS — I10 PRIMARY HYPERTENSION: ICD-10-CM

## 2024-04-19 DIAGNOSIS — E11.9 TYPE 2 DIABETES MELLITUS WITHOUT COMPLICATION, WITHOUT LONG-TERM CURRENT USE OF INSULIN (HCC): Primary | ICD-10-CM

## 2024-04-19 DIAGNOSIS — E78.5 DYSLIPIDEMIA: ICD-10-CM

## 2024-04-19 PROCEDURE — 97802 MEDICAL NUTRITION INDIV IN: CPT

## 2024-04-22 VITALS — BODY MASS INDEX: 27.89 KG/M2 | WEIGHT: 200 LBS

## 2024-04-22 NOTE — PATIENT INSTRUCTIONS
Consume 3 balanced meals/day at appropriate times.  45 grams carbohydrate/meal and 15 grams carbohydrate/snack.  Consume 2 balanced snacks/day at appropriate times.

## 2024-04-22 NOTE — PROGRESS NOTES
"Medical Nutrition Therapy        Assessment    Visit Type: Initial visit  Chief complaint/Medical Diagnosis/reason for visit Type 2 Diabetes Mellitus without complication without long term current use of insulin    HPI Bon cam in with his wife, Devorah, today  for his initial medical Nutrition therapy appointment.  Bon stated his goal is to lower his A1C, lower his blood sugars, to eat better, and not need any diabetes medication.  Bon's latest A1C = 10.3 on 3/8/24.  Obtained a 24 hour food recall. Problems identified in food recall include inconsistent carbohydrate intake, and some unbalanced meals.  Bon also stated he was eating about 2 bananas/day and was eating sweets 1-2x/day in the form of cookies (10) or 2 tastycake cupcakes or 3 scoops of ice cream.  However, Bon stated he is not eating sweets since his diabetes diagnosis 33-4 weeks ago.  Provided patient with a 2000 calorie balanced individualized meal plan to assist with consistency, balance and portion control.  Encouraged the consumption of balanced meals and snacks at appropriate times.  Advised patient to keep carbohydrate intake to 45 grams per meal and 15 grams per snack to assist with glycemic control.  My Plate, food models, portion booklet and food labels were used to teach basic carbohydrate counting. Patient stated he will call at a later date if he desires additional Medical Nutrition therapy. RD will remain available for further dietary questions/concerns.     Ht Readings from Last 1 Encounters:   04/11/24 5' 11\" (1.803 m)     Wt Readings from Last 3 Encounters:   04/22/24 90.7 kg (200 lb)   04/11/24 92 kg (202 lb 12.8 oz)   04/04/24 91.8 kg (202 lb 6.4 oz)     Weight Change: Yes lost 2 lbs since his last chart encounter.    Barriers to Learning: no barriers    Do you follow any special diet presently?: Yes - trying to eat more healthy.  Who shops: spouse  Who cooks: spouse    Food Log: Completed via the method of food " recall    Breakfast:8AM - Andrzej Reddyn egg sausage sandwich on waffles, coffee with creamer  Morning Snack:None  Lunch:Noon - leftovers from dinner: rice and beans with chicken, fresh tomatoes, unsweetened flavored water beverage  Afternoon Snack: None  Dinner:5:30PM - rice and beans with shrimp, fresh tomatoes with elton salad, sugar free ice tea  Evening Snack:10 - was eating 3 scoops ice cream or 1 c Sylvester noodle soup  Beverages: unsweetened water beverage, coffee, sugar free ice tea  Eating out/Take out:sometimes  Exercise works full time as     Calorie needs 2000kcals/day Carbs: 45g/meal, 15g/snack     Fat:heart healthy   Protein:balanced amount with Carbs    Nutrition Diagnosis:  Food and nutrition related knowledge deficit  related to Lack of prior exposure to accurate nutrition related information as evidenced by Verbalizes inaccurate or incomplete information    Intervention: plate method, label reading, carbohydrate counting, increased protein intake, increased plant based foods, meal timing, weight/ BMI goals, meal planning, individualized meal plan, and monitoring portion control     Treatment Goals: Patient will consume 3 meals a day, Patient will consume snacks, and Patient will count carbohydrates    Monitoring and evaluation:    Term code indicator  FH 1.3.2 Food Intake Criteria: Consume 3 balanced meals/day at appropriate times.  Term code indicator  FH 1.6.3 Carbohydrate Intake Criteria: 45 grams carbohydrate/meal and 15 grams carbohydrate/snack.  Term code indicator  FH 1.3.2 Food Intake Criteria: Consume 2 balanced snacks/day at appropriate times.    Materials Provided: Portion booklet, CHO counting, individualized meal plan    Patient’s Response to Instruction:  Comprehensiongood  Motivationgood  Expected Compliancegood    Begin Time: 2:45PM  End Time: 4:05PM  Referring Provider: Monty Mitchell MD    Thank you for coming to the [facility] for education  today.  Please feel free to call with any questions or concerns.    Karly Hwang  [address] [id number] [phone]

## 2024-05-06 DIAGNOSIS — N40.1 BENIGN PROSTATIC HYPERPLASIA WITH INCOMPLETE BLADDER EMPTYING: ICD-10-CM

## 2024-05-06 DIAGNOSIS — R39.14 BENIGN PROSTATIC HYPERPLASIA WITH INCOMPLETE BLADDER EMPTYING: ICD-10-CM

## 2024-05-06 RX ORDER — TAMSULOSIN HYDROCHLORIDE 0.4 MG/1
0.4 CAPSULE ORAL
Qty: 30 CAPSULE | Refills: 1 | Status: SHIPPED | OUTPATIENT
Start: 2024-05-06

## 2024-06-11 NOTE — PROGRESS NOTES
"Chief Complaint:  "I need a colonoscopy."    HPI:  The patient is a 78 year old man presenting for a surveillance colonoscopy.  He had a polyp in .  The patient denies any abdominal pain, weight loss, nausea, emesis, diarrhea, constipation, melena, or hematochezia.  The patient also denies a family history of colon cancer.    Past Medical History:   Diagnosis Date    Arthritis     Cancer of palate     HTN (hypertension)     Hyperlipidemia     Prostate cancer          Past Surgical History:   Procedure Laterality Date    BACK SURGERY  y-6    Cancer of palate surgery      Late - [facility]     CARPAL TUNNEL RELEASE  15    right hand,Left hand     KNEE SURGERY  y-40    left knee    KNEE SURGERY Right 12-1-15    TKR    Radio active seed Implant      2012    TOTAL HIP ARTHROPLASTY  3/2011     Family History   Problem Relation Age of Onset    Coronary artery disease Father          Cancer Sister      Liver Cancer -    Diabetes Sister          No Known Problems Brother     No Known Problems Sister     No Known Problems Sister     No Known Problems Brother     No Known Problems Mother     Lung cancer Brother 60     - was a tobacco user, had lung cancer    Cancer Brother      Liver Cancer-     Lung cancer Sister      Alive    Breast cancer Sister     Clotting disorder Sister 75     blood clot on brain-alive    Stroke Brother          Glaucoma Cousin     No Known Problems Maternal Aunt     No Known Problems Maternal Uncle     No Known Problems Paternal Aunt     No Known Problems Paternal Uncle     No Known Problems Maternal Grandmother     No Known Problems Maternal Grandfather     No Known Problems Paternal Grandmother     No Known Problems Paternal Grandfather     Macular degeneration Neg Hx     Strabismus Neg Hx     Amblyopia Neg Hx     Blindness Neg Hx     Cataracts Neg Hx     Hypertension Neg Hx     Retinal detachment Neg Hx  " Adult Annual Physical  Name: Bon Snyder      : 1960      MRN: [id number]  Encounter Provider: Monty Mitchell MD  Encounter Date: 2024   Encounter department: [facility] [place]    Assessment & Plan   1. Type 2 diabetes mellitus without complication, without long-term current use of insulin (HCC)  Assessment & Plan:    Lab Results   Component Value Date    HGBA1C 10.3 (H) 2024   Currently patient taking metformin 1000 mg daily we will follow-up with repeat lab with hemoglobin A1c and adjust the dose if necessary.  Orders:  -     metFORMIN (GLUCOPHAGE-XR) 500 mg 24 hr tablet; Take 2 tablets (1,000 mg total) by mouth daily with breakfast  -     Hemoglobin A1C; Future; Expected date: 2024  -     Hemoglobin A1C  2. Benign prostatic hyperplasia with incomplete bladder emptying  Assessment & Plan:  Patient is currently taking tamsulosin 0.4 mg daily at bedtime appears to be helping a lot with his increased frequency urgency.  However his PSA level was coming up on the higher side we recommended patient be seen by the urologist he has an appointment on .  Orders:  -     tamsulosin (FLOMAX) 0.4 mg; Take 1 capsule (0.4 mg total) by mouth daily with dinner  3. Dyslipidemia  Assessment & Plan:  Patient lipid profile that showed cholesterol at 187 triglycerides 356 HDL 31 and LDL at 96 and would repeat this labs will start him on statin after getting the lab reports  Orders:  -     Lipid panel; Future  -     Lipid panel  4. Elevated PSA  Assessment & Plan:  Patient PSA level was 5.7 he is going to have a follow-up appointment with the urologist next few weeks  5. Hyperkalemia  Assessment & Plan:  Last potassium level is normal at 5.1    6. Primary hypertension  Assessment & Plan:  Blood pressure today 128/72 not on lisinopril anymore we will start him on losartan 25 mg daily  Orders:  -     losartan (COZAAR) 25 mg tablet; Take 1 tablet (25 mg total) by mouth daily  7.  "   Thyroid disease Neg Hx      Social History     Social History    Marital status:      Spouse name: N/A    Number of children: N/A    Years of education: N/A     Occupational History     Retired     Social History Main Topics    Smoking status: Former Smoker     Packs/day: 3.00     Years: 20.00     Quit date: 7/10/1997    Smokeless tobacco: Never Used      Comment: Quit 1997-smoked for 40 years ,2 packs a day on an average    Alcohol use No      Comment: used to drink Occasionally    Drug use: No    Sexual activity: Yes     Partners: Female     Other Topics Concern    Not on file     Social History Narrative        Review of patient's allergies indicates:  No Known Allergies    ROS:  No chest pain or dyspnea.  No dysuria.  No heartburn or dysphagia.  Otherwise as stated above.  Ten other systems negative.    Vitals:    04/10/17 0847   BP: (!) 156/90   BP Location: Left arm   BP Method: Automatic   Pulse: 83   Resp: 18   Temp: 97.7 °F (36.5 °C)   TempSrc: Oral   SpO2: 98%   Weight: 81.6 kg (180 lb)   Height: 5' 6" (1.676 m)     P.E.:  GEN: A x O x 3, NAD  SKIN: No jaundice  HEENT: EOMI, PERRL, anicteric sclera  CV: RRR, no M/R/G  Chest: CTA B  Abdomen: soft, NTND, normoactive BS  Ext: No C/C/E.  2+ dorsalis pedis pulses B  Neuro: No asterixes or tremors.  CN II-XII intact  Musculoskeletal: 5/5 strength bilaterally    Labs:  Lab Results   Component Value Date    WBC 4.89 04/12/2016    HGB 13.5 (L) 04/12/2016    HCT 41.6 04/12/2016    MCV 82 04/12/2016     04/12/2016      CMP  Sodium   Date Value Ref Range Status   03/21/2017 140 136 - 145 mmol/L Final     Potassium   Date Value Ref Range Status   03/21/2017 4.7 3.5 - 5.1 mmol/L Final     Comment:     Specimen slightly hemolyzed     Chloride   Date Value Ref Range Status   03/21/2017 105 95 - 110 mmol/L Final     CO2   Date Value Ref Range Status   03/21/2017 27 23 - 29 mmol/L Final     Glucose   Date Value Ref Range Status   03/21/2017 102 " Prediabetes  -     Comprehensive metabolic panel; Future  -     Comprehensive metabolic panel  8. Screening for thyroid disorder  -     TSH, 3rd generation with Free T4 reflex; Future; Expected date: 06/12/2024  -     TSH, 3rd generation with Free T4 reflex  9. Vitamin D deficiency  -     Vitamin D 25 hydroxy; Future  -     Vitamin D 25 hydroxy  10. Annual physical exam    Immunizations and preventive care screenings were discussed with patient today. Appropriate education was printed on patient's after visit summary.    Discussed risks and benefits of prostate cancer screening. We discussed the controversial history of PSA screening for prostate cancer in the [place] as well as the risk of over detection and over treatment of prostate cancer by way of PSA screening.  The patient understands that PSA blood testing is an imperfect way to screen for prostate cancer and that elevated PSA levels in the blood may also be caused by infection, inflammation, prostatic trauma or manipulation, urological procedures, or by benign prostatic enlargement.    The role of the digital rectal examination in prostate cancer screening was also discussed and I discussed with him that there is large interobserver variability in the findings of digital rectal examination.    Counseling:  Alcohol/drug use: discussed moderation in alcohol intake, the recommendations for healthy alcohol use, and avoidance of illicit drug use.  Dental Health: discussed importance of regular tooth brushing, flossing, and dental visits.  Injury prevention: discussed safety/seat belts, safety helmets, smoke detectors, carbon dioxide detectors, and smoking near bedding or upholstery.  Sexual health: discussed sexually transmitted diseases, partner selection, use of condoms, avoidance of unintended pregnancy, and contraceptive alternatives.  Exercise: the importance of regular exercise/physical activity was discussed. Recommend exercise 3-5 times per week  70 - 110 mg/dL Final     BUN, Bld   Date Value Ref Range Status   03/21/2017 12 8 - 23 mg/dL Final     Creatinine   Date Value Ref Range Status   03/21/2017 1.3 0.5 - 1.4 mg/dL Final     Calcium   Date Value Ref Range Status   03/21/2017 9.0 8.7 - 10.5 mg/dL Final     Total Protein   Date Value Ref Range Status   03/21/2017 7.6 6.0 - 8.4 g/dL Final     Albumin   Date Value Ref Range Status   03/21/2017 3.7 3.5 - 5.2 g/dL Final     Total Bilirubin   Date Value Ref Range Status   03/21/2017 0.3 0.1 - 1.0 mg/dL Final     Comment:     For infants and newborns, interpretation of results should be based  on gestational age, weight and in agreement with clinical  observations.  Premature Infant recommended reference ranges:  Up to 24 hours.............<8.0 mg/dL  Up to 48 hours............<12.0 mg/dL  3-5 days..................<15.0 mg/dL  6-29 days.................<15.0 mg/dL       Alkaline Phosphatase   Date Value Ref Range Status   03/21/2017 63 55 - 135 U/L Final     AST   Date Value Ref Range Status   03/21/2017 21 10 - 40 U/L Final     ALT   Date Value Ref Range Status   03/21/2017 16 10 - 44 U/L Final     Anion Gap   Date Value Ref Range Status   03/21/2017 8 8 - 16 mmol/L Final     eGFR if    Date Value Ref Range Status   03/21/2017 >60 >60 mL/min/1.73 m^2 Final     eGFR if non    Date Value Ref Range Status   03/21/2017 53 (A) >60 mL/min/1.73 m^2 Final     Comment:     Calculation used to obtain the estimated glomerular filtration  rate (eGFR) is the CKD-EPI equation. Since race is unknown   in our information system, the eGFR values for   -American and Non--American patients are given   for each creatinine result.         No results for input(s): INR, APTT in the last 24 hours.    Invalid input(s): PT    A/P:  The patient is a 78 year old man presenting for a colonoscopy.  1.  Colonoscopy - he can undergo a colonoscopy.  I have explained the risks, benefits, and  for at least 30 minutes.          History of Present Illness     Adult Annual Physical:  Patient presents for annual physical.     Diet and Physical Activity:  - Diet/Nutrition: portion control, heart healthy (low sodium) diet and consuming 3-5 servings of fruits/vegetables daily.  - Exercise: walking, moderate cardiovascular exercise, strength training exercises, 5-7 times a week on average and more than 2 hours on average.    General Health:  - Sleep: sleeps well and 7-8 hours of sleep on average.  - Hearing: normal hearing bilateral ears.  - Vision: vision problems, wears glasses and most recent eye exam < 1 year ago.  - Dental: regular dental visits and brushes teeth twice daily.     Health:  - History of STDs: no.   - Urinary symptoms: none.     Advanced Care Planning:  - Has an advanced directive?: yes    - Has a durable medical POA?: yes    - ACP document given to patient?: yes      Review of Systems   Constitutional:  Negative for chills and fever.   HENT:  Negative for ear pain and sore throat.    Eyes:  Negative for pain and visual disturbance.   Respiratory:  Negative for cough and shortness of breath.    Cardiovascular:  Negative for chest pain and palpitations.   Gastrointestinal:  Negative for abdominal pain and vomiting.   Genitourinary:  Negative for dysuria and hematuria.   Musculoskeletal:  Negative for arthralgias and back pain.   Skin:  Negative for color change and rash.   Neurological:  Negative for seizures and syncope.   All other systems reviewed and are negative.    Pertinent Medical History   Patient is coming here for a follow-up evaluation with regards to his diabetes and also BPH we started him on lisinopril as a protective medication for the kidneys but he developed cough when he stopped it.  Patient feeling much better compared to before more energy.  He was also seen by the dietitian.  Currently taking 1000 mg of metformin.      Medical History Reviewed by provider this encounter:   "Tobacco  Allergies  Meds  Problems  Med Hx  Surg Hx  Fam Hx       Current Outpatient Medications on File Prior to Visit   Medication Sig Dispense Refill   • [DISCONTINUED] metFORMIN (GLUCOPHAGE-XR) 500 mg 24 hr tablet Take 2 tablets (1,000 mg total) by mouth daily with breakfast 180 tablet 1   • [DISCONTINUED] tamsulosin (FLOMAX) 0.4 mg Take 1 capsule (0.4 mg total) by mouth daily with dinner 30 capsule 1   • [DISCONTINUED] lisinopril (ZESTRIL) 10 mg tablet Take 1 tablet (10 mg total) by mouth daily 30 tablet 1     No current facility-administered medications on file prior to visit.      Social History     Tobacco Use   • Smoking status: Never   • Smokeless tobacco: Never   Vaping Use   • Vaping status: Never Used   Substance and Sexual Activity   • Alcohol use: Yes   • Drug use: No   • Sexual activity: Yes     Birth control/protection: None       Objective     /72 (BP Location: Right arm, Patient Position: Sitting, Cuff Size: Standard)   Pulse (!) 108   Ht 5' 11\" (1.803 m)   Wt 87.9 kg (193 lb 12.8 oz)   SpO2 97%   BMI 27.03 kg/m²     Physical Exam  Vitals and nursing note reviewed.   Constitutional:       General: He is not in acute distress.     Appearance: He is well-developed.   HENT:      Head: Normocephalic and atraumatic.   Eyes:      Conjunctiva/sclera: Conjunctivae normal.   Cardiovascular:      Rate and Rhythm: Normal rate and regular rhythm.      Heart sounds: No murmur heard.  Pulmonary:      Effort: Pulmonary effort is normal. No respiratory distress.      Breath sounds: Normal breath sounds.   Abdominal:      Palpations: Abdomen is soft.      Tenderness: There is no abdominal tenderness.   Musculoskeletal:         General: No swelling.      Cervical back: Neck supple.   Skin:     General: Skin is warm and dry.      Capillary Refill: Capillary refill takes less than 2 seconds.   Neurological:      Mental Status: He is alert.   Psychiatric:         Mood and Affect: Mood normal. " alternatives of the procedure in detail.  The patient voices understanding and all questions have been answered.  The patient agrees to proceed as planned.           Administrative Statements   I have spent a total time of  40minutes on 06/12/24 In caring for this patient including Diagnostic results, Prognosis, Risks and benefits of tx options, Instructions for management, Patient and family education, Importance of tx compliance, Risk factor reductions, Impressions, Counseling / Coordination of care, Documenting in the medical record, Reviewing / ordering tests, medicine, procedures  , and Obtaining or reviewing history  .

## 2024-06-12 ENCOUNTER — OFFICE VISIT (OUTPATIENT)
Dept: FAMILY MEDICINE CLINIC | Facility: CLINIC | Age: 64
End: 2024-06-12
Payer: COMMERCIAL

## 2024-06-12 VITALS
BODY MASS INDEX: 27.13 KG/M2 | HEIGHT: 71 IN | WEIGHT: 193.8 LBS | DIASTOLIC BLOOD PRESSURE: 72 MMHG | SYSTOLIC BLOOD PRESSURE: 128 MMHG | HEART RATE: 108 BPM | OXYGEN SATURATION: 97 %

## 2024-06-12 DIAGNOSIS — N40.1 BENIGN PROSTATIC HYPERPLASIA WITH INCOMPLETE BLADDER EMPTYING: ICD-10-CM

## 2024-06-12 DIAGNOSIS — Z00.00 ANNUAL PHYSICAL EXAM: ICD-10-CM

## 2024-06-12 DIAGNOSIS — I10 PRIMARY HYPERTENSION: ICD-10-CM

## 2024-06-12 DIAGNOSIS — E11.9 TYPE 2 DIABETES MELLITUS WITHOUT COMPLICATION, WITHOUT LONG-TERM CURRENT USE OF INSULIN (HCC): Primary | ICD-10-CM

## 2024-06-12 DIAGNOSIS — R73.03 PREDIABETES: ICD-10-CM

## 2024-06-12 DIAGNOSIS — R97.20 ELEVATED PSA: ICD-10-CM

## 2024-06-12 DIAGNOSIS — E55.9 VITAMIN D DEFICIENCY: ICD-10-CM

## 2024-06-12 DIAGNOSIS — E78.5 DYSLIPIDEMIA: ICD-10-CM

## 2024-06-12 DIAGNOSIS — E87.5 HYPERKALEMIA: ICD-10-CM

## 2024-06-12 DIAGNOSIS — R39.14 BENIGN PROSTATIC HYPERPLASIA WITH INCOMPLETE BLADDER EMPTYING: ICD-10-CM

## 2024-06-12 DIAGNOSIS — Z13.29 SCREENING FOR THYROID DISORDER: ICD-10-CM

## 2024-06-12 PROCEDURE — 99396 PREV VISIT EST AGE 40-64: CPT | Performed by: INTERNAL MEDICINE

## 2024-06-12 PROCEDURE — 99213 OFFICE O/P EST LOW 20 MIN: CPT | Performed by: INTERNAL MEDICINE

## 2024-06-12 RX ORDER — LOSARTAN POTASSIUM 25 MG/1
25 TABLET ORAL DAILY
Qty: 30 TABLET | Refills: 5 | Status: SHIPPED | OUTPATIENT
Start: 2024-06-12

## 2024-06-12 RX ORDER — METFORMIN HYDROCHLORIDE 500 MG/1
1000 TABLET, EXTENDED RELEASE ORAL
Qty: 180 TABLET | Refills: 1 | Status: SHIPPED | OUTPATIENT
Start: 2024-06-12

## 2024-06-12 RX ORDER — TAMSULOSIN HYDROCHLORIDE 0.4 MG/1
0.4 CAPSULE ORAL
Qty: 30 CAPSULE | Refills: 1 | Status: SHIPPED | OUTPATIENT
Start: 2024-06-12

## 2024-06-12 RX ORDER — LISINOPRIL 10 MG/1
10 TABLET ORAL DAILY
Qty: 30 TABLET | Refills: 5 | Status: SHIPPED | OUTPATIENT
Start: 2024-06-12 | End: 2024-06-12 | Stop reason: ALTCHOICE

## 2024-06-12 NOTE — ASSESSMENT & PLAN NOTE
Patient lipid profile that showed cholesterol at 187 triglycerides 356 HDL 31 and LDL at 96 and would repeat this labs will start him on statin after getting the lab reports

## 2024-06-12 NOTE — ASSESSMENT & PLAN NOTE
Patient is currently taking tamsulosin 0.4 mg daily at bedtime appears to be helping a lot with his increased frequency urgency.  However his PSA level was coming up on the higher side we recommended patient be seen by the urologist he has an appointment on July 16.

## 2024-06-12 NOTE — ASSESSMENT & PLAN NOTE
Lab Results   Component Value Date    HGBA1C 10.3 (H) 03/08/2024   Currently patient taking metformin 1000 mg daily we will follow-up with repeat lab with hemoglobin A1c and adjust the dose if necessary.

## 2024-06-12 NOTE — ASSESSMENT & PLAN NOTE
Patient PSA level was 5.7 he is going to have a follow-up appointment with the urologist next few weeks

## 2024-07-04 LAB
25(OH)D3+25(OH)D2 SERPL-MCNC: 35 NG/ML (ref 30–100)
ALBUMIN SERPL-MCNC: 4.6 G/DL (ref 3.9–4.9)
ALP SERPL-CCNC: 65 IU/L (ref 44–121)
ALT SERPL-CCNC: 28 IU/L (ref 0–44)
AST SERPL-CCNC: 22 IU/L (ref 0–40)
BILIRUB SERPL-MCNC: 0.5 MG/DL (ref 0–1.2)
BUN SERPL-MCNC: 25 MG/DL (ref 8–27)
BUN/CREAT SERPL: 25 (ref 10–24)
CALCIUM SERPL-MCNC: 9.6 MG/DL (ref 8.6–10.2)
CHLORIDE SERPL-SCNC: 105 MMOL/L (ref 96–106)
CHOLEST SERPL-MCNC: 175 MG/DL (ref 100–199)
CHOLEST/HDLC SERPL: 4.7 RATIO (ref 0–5)
CO2 SERPL-SCNC: 22 MMOL/L (ref 20–29)
CREAT SERPL-MCNC: 1 MG/DL (ref 0.76–1.27)
EGFR: 86 ML/MIN/1.73
EST. AVERAGE GLUCOSE BLD GHB EST-MCNC: 143 MG/DL
GLOBULIN SER-MCNC: 1.9 G/DL (ref 1.5–4.5)
GLUCOSE SERPL-MCNC: 155 MG/DL (ref 70–99)
HBA1C MFR BLD: 6.6 % (ref 4.8–5.6)
HDLC SERPL-MCNC: 37 MG/DL
LDLC SERPL CALC-MCNC: 122 MG/DL (ref 0–99)
POTASSIUM SERPL-SCNC: 4.4 MMOL/L (ref 3.5–5.2)
PROT SERPL-MCNC: 6.5 G/DL (ref 6–8.5)
SL AMB VLDL CHOLESTEROL CALC: 16 MG/DL (ref 5–40)
SODIUM SERPL-SCNC: 142 MMOL/L (ref 134–144)
TRIGL SERPL-MCNC: 83 MG/DL (ref 0–149)
TSH SERPL DL<=0.005 MIU/L-ACNC: 2.06 UIU/ML (ref 0.45–4.5)

## 2024-07-08 ENCOUNTER — TELEPHONE (OUTPATIENT)
Dept: FAMILY MEDICINE CLINIC | Facility: CLINIC | Age: 64
End: 2024-07-08

## 2024-07-08 NOTE — TELEPHONE ENCOUNTER
----- Message from Monty Mitchell MD sent at 7/7/2024 10:09 PM EDT -----  Inform patient long-term sugar report is much better at 6.6 continue to watch the diet and take the medication regularly and follow-up in my office.  Keep the appointment he has now

## 2024-07-15 ENCOUNTER — TELEPHONE (OUTPATIENT)
Dept: ADMINISTRATIVE | Facility: OTHER | Age: 64
End: 2024-07-15

## 2024-07-15 NOTE — TELEPHONE ENCOUNTER
Upon review of the In Basket request and the patient's chart, initial outreach has been made via telephone call to facility. The outcome of the telephone call was a fax request form to be sent to Office/Facility. Please see Contacts section for details.    No date on original sent to scanning team    Thank you  Nikki Greene MA

## 2024-07-15 NOTE — LETTER
Diabetic Eye Exam Form    Date Requested: 07/15/24  Patient: Bon Snyder  Patient : 1960   Referring Provider: Monty Mitchell MD      DIABETIC Eye Exam Date __________________________x      Type of Exam MUST be documented for Diabetic Eye Exams. Please CHECK ONE.     Retinal Exam       Dilated Retinal Exam       OCT       Optomap-Iris Exam   X   Fundus Photography       Left Eye - Please check Retinopathy or No Retinopathy        Exam did show retinopathy X   Exam did not show retinopathy       Right Eye - Please check Retinopathy or No Retinopathy       Exam did show retinopathy X   Exam did not show retinopathy       Comments __________________________________________________________    Practice Providing Exam ___[facility][id number]  _____________________________________    XX Exam Performed By (print name) _______________________________________x      XX Provider Signature __________________________________________________ x_      These reports are needed for  compliance.  Please fax this completed form and a copy of the Diabetic Eye Exam report to our office located at [address] [place] [address] as soon as possible via Fax [phone] attention Nikki: Phone [phone]  We thank you for your assistance in treating our mutual patient.

## 2024-07-16 ENCOUNTER — CONSULT (OUTPATIENT)
Dept: UROLOGY | Facility: CLINIC | Age: 64
End: 2024-07-16
Payer: COMMERCIAL

## 2024-07-16 VITALS
DIASTOLIC BLOOD PRESSURE: 80 MMHG | OXYGEN SATURATION: 97 % | BODY MASS INDEX: 26.88 KG/M2 | WEIGHT: 192 LBS | HEART RATE: 103 BPM | HEIGHT: 71 IN | SYSTOLIC BLOOD PRESSURE: 120 MMHG

## 2024-07-16 DIAGNOSIS — N40.1 BENIGN PROSTATIC HYPERPLASIA WITH INCOMPLETE BLADDER EMPTYING: Primary | ICD-10-CM

## 2024-07-16 DIAGNOSIS — R97.20 ELEVATED PSA: ICD-10-CM

## 2024-07-16 DIAGNOSIS — R39.14 BENIGN PROSTATIC HYPERPLASIA WITH INCOMPLETE BLADDER EMPTYING: Primary | ICD-10-CM

## 2024-07-16 PROCEDURE — 99203 OFFICE O/P NEW LOW 30 MIN: CPT | Performed by: UROLOGY

## 2024-07-16 NOTE — PATIENT INSTRUCTIONS
We will check the free and total PSA and both Dr. Mitchell and myself will get the report.  If the numbers look good we can just have you follow-up with him annually and get him to refill your Flomax.  If there is any question I will be happy to see you back here.

## 2024-07-16 NOTE — PROGRESS NOTES
Bon Snyder is a(n) 63 y.o. male. , :  1960    Subjective     Assessment:  The primary encounter diagnosis was Benign prostatic hyperplasia with incomplete bladder emptying. A diagnosis of Elevated PSA was also pertinent to this visit.  63-year-old gentleman referred for elevated PSA.  His urination symptoms have improved significantly since starting the Flomax.  No side effects.  No hematuria or dysuria.  Gets up maybe once each night.  Rectal examination reveals the prostate is definitely enlarged probably about 60 g in volume without any specific nodules or tenderness.    Plan: Would continue on the Flomax since it seems to help him.  I will check a free and total PSA to see if it has improved since he has changed his diet considerably in the last few months.  If the PSA is still elevated and rising or the free and total percentage is reduced, he might need to submit to a prostate biopsy.  I would not recommend that immediately.     History  Elevated PSA 5.2024.  Elevated A1c  BPH on Flomax 0.4 mg daily since 2024.  Frequency, urgency, and hesitancy.  BPH on CHRISTINA.    Prior Visits  None    2024 [facility]  63-year-old gentleman referred for elevated PSA.  His urination symptoms have improved significantly since starting the Flomax.  No side effects.  No hematuria or dysuria.  Gets up maybe once each night.  Rectal examination reveals the prostate is definitely enlarged probably about 60 g in volume without any specific nodules or tenderness.    Plan: Would continue on the Flomax since it seems to help him.  I will check a free and total PSA to see if it has improved since he has changed his diet considerably in the last few months.  If the PSA is still elevated and rising or the free and total percentage is reduced, he might need to submit to a prostate biopsy.  I would not recommend that immediately.    Review of Systems    Lab Results   Component Value Date    PSA 5.7 (H)  "03/08/2024     No results found for: \"TESTOSTERONE\"  No components found for: \"CR\"  No results found for: \"HBA1C\"    Objective     /80 (BP Location: Left arm, Patient Position: Sitting, Cuff Size: Standard)   Pulse 103   Ht 5' 11\" (1.803 m)   Wt 87.1 kg (192 lb)   SpO2 97%   BMI 26.78 kg/m²     Physical Exam      [facility] Martina, [facility]  "

## 2024-08-05 ENCOUNTER — TELEPHONE (OUTPATIENT)
Dept: FAMILY MEDICINE CLINIC | Facility: CLINIC | Age: 64
End: 2024-08-05

## 2024-08-20 ENCOUNTER — OFFICE VISIT (OUTPATIENT)
Dept: FAMILY MEDICINE CLINIC | Facility: CLINIC | Age: 64
End: 2024-08-20
Payer: COMMERCIAL

## 2024-08-20 VITALS — BODY MASS INDEX: 27.4 KG/M2 | OXYGEN SATURATION: 98 % | HEIGHT: 70 IN | HEART RATE: 100 BPM | WEIGHT: 191.4 LBS

## 2024-08-20 DIAGNOSIS — R73.03 PREDIABETES: ICD-10-CM

## 2024-08-20 DIAGNOSIS — I10 PRIMARY HYPERTENSION: ICD-10-CM

## 2024-08-20 DIAGNOSIS — E78.5 DYSLIPIDEMIA: Primary | ICD-10-CM

## 2024-08-20 DIAGNOSIS — H25.011 CORTICAL AGE-RELATED CATARACT OF RIGHT EYE: ICD-10-CM

## 2024-08-20 DIAGNOSIS — R97.20 ELEVATED PSA: ICD-10-CM

## 2024-08-20 DIAGNOSIS — E11.9 TYPE 2 DIABETES MELLITUS WITHOUT COMPLICATION, WITHOUT LONG-TERM CURRENT USE OF INSULIN (HCC): ICD-10-CM

## 2024-08-20 DIAGNOSIS — R39.14 BENIGN PROSTATIC HYPERPLASIA WITH INCOMPLETE BLADDER EMPTYING: ICD-10-CM

## 2024-08-20 DIAGNOSIS — N40.1 BENIGN PROSTATIC HYPERPLASIA WITH INCOMPLETE BLADDER EMPTYING: ICD-10-CM

## 2024-08-20 PROCEDURE — 99214 OFFICE O/P EST MOD 30 MIN: CPT | Performed by: INTERNAL MEDICINE

## 2024-08-20 RX ORDER — METFORMIN HCL 500 MG
1000 TABLET, EXTENDED RELEASE 24 HR ORAL
Qty: 180 TABLET | Refills: 1 | Status: SHIPPED | OUTPATIENT
Start: 2024-08-20

## 2024-08-20 RX ORDER — TAMSULOSIN HYDROCHLORIDE 0.4 MG/1
0.4 CAPSULE ORAL
Qty: 90 CAPSULE | Refills: 1 | Status: SHIPPED | OUTPATIENT
Start: 2024-08-20

## 2024-08-20 RX ORDER — LOSARTAN POTASSIUM 25 MG/1
25 TABLET ORAL DAILY
Qty: 90 TABLET | Refills: 1 | Status: SHIPPED | OUTPATIENT
Start: 2024-08-20

## 2024-08-20 NOTE — ASSESSMENT & PLAN NOTE
Lab Results   Component Value Date    HGBA1C 6.6 (H) 07/03/2024   Patient blood sugars are significantly improved with a hemoglobin A1c came down from 10.3 now it is 6.6 continue with the metformin follow-up with repeat labs later.

## 2024-08-20 NOTE — PROGRESS NOTES
Office Visit Note  24     Bon Snyder 63 y.o. male MRN: [id number]  : 1960    Assessment:     1. Dyslipidemia  Assessment & Plan:  Patient with cholesterol of 187 triglycerides 356 HDL 31 LDL 96.  After starting the medication for his diabetes which is under control now his lipid profile now shows cholesterol at 175 triglycerides 83 HDL 37 LDL at 122.  Next visit will start the patient on lipid-lowering agent also.  Orders:  -     Lipid panel; Future  -     Lipid panel  2. Primary hypertension  Assessment & Plan:  Patient blood pressure is much better 124/76 continue with losartan 25 mg daily patient had developed side effects with the lisinopril with cough which has been discontinued  Orders:  -     losartan (COZAAR) 25 mg tablet; Take 1 tablet (25 mg total) by mouth daily  3. Type 2 diabetes mellitus without complication, without long-term current use of insulin ([facility])  Assessment & Plan:    Lab Results   Component Value Date    HGBA1C 6.6 (H) 2024   Patient blood sugars are significantly improved with a hemoglobin A1c came down from 10.3 now it is 6.6 continue with the metformin follow-up with repeat labs later.  Orders:  -     metFORMIN (GLUCOPHAGE-XR) 500 mg 24 hr tablet; Take 2 tablets (1,000 mg total) by mouth daily with breakfast  -     Hemoglobin A1C; Future; Expected date: 2024  -     Albumin / creatinine urine ratio; Future  -     Hemoglobin A1C  -     Albumin / creatinine urine ratio  4. Benign prostatic hyperplasia with incomplete bladder emptying  Assessment & Plan:  Continue with tamsulosin 0.4 mg daily for his BPH he was seen by the urologist to recommend to continue the same for now.  Patient also doing much better with the medication symptom wise  Orders:  -     tamsulosin (FLOMAX) 0.4 mg; Take 1 capsule (0.4 mg total) by mouth daily with dinner  5. Elevated PSA  Assessment & Plan:  Patient with elevated PSA levels seen by the urologist whose note I have appreciated no  intervention at present time he does have findings of BPH however will do a free and total PSA levels and look at the trend based on that we will make decisions regarding follow-up with a urologist or not.  Orders:  -     PSA, total and free; Future  -     PSA, total and free  6. Cortical age-related cataract of right eye  Assessment & Plan:  Patient with cataract right eye going for surgery on August 28 history and physicals completed  7. Prediabetes  -     Comprehensive metabolic panel; Future  -     Comprehensive metabolic panel             Discussion Summary and Plan:  Today's care plan and medications were reviewed with patient in detail and all their questions answered to their satisfaction.    No chief complaint on file.     Subjective:  Patient is coming here for follow-up evaluation with regards to his diabetes, hypertension.  Patient has developed cataract on the right eye for which she is undergoing surgery soon.  History and physicals completed.    Patient was seen by the urologist regarding elevated PSA levels and also findings suggestive of BPH currently taking Flomax.  Patient is going to have a repeat PSA levels done free and total.        The following portions of the patient's history were reviewed and updated as appropriate: allergies, current medications, past family history, past medical history, past social history, past surgical history and problem list.    Review of Systems   Constitutional:  Negative for chills and fever.   HENT:  Negative for ear pain and sore throat.    Eyes:  Negative for pain and visual disturbance.   Respiratory:  Negative for cough and shortness of breath.    Cardiovascular:  Negative for chest pain and palpitations.   Gastrointestinal:  Negative for abdominal pain and vomiting.   Genitourinary:  Negative for dysuria and hematuria.   Musculoskeletal:  Negative for arthralgias and back pain.   Skin:  Negative for color change and rash.   Neurological:  Negative for  "seizures and syncope.   All other systems reviewed and are negative.        Historical Information   Patient Active Problem List   Diagnosis    Benign prostatic hyperplasia with incomplete bladder emptying    Primary hypertension    Type 2 diabetes mellitus without complication, without long-term current use of insulin (HCC)    Dyslipidemia    Hyperkalemia    Elevated PSA    Cortical age-related cataract of right eye     Past Medical History:   Diagnosis Date    Gout      Past Surgical History:   Procedure Laterality Date    NO PAST SURGERIES       Social History     Substance and Sexual Activity   Alcohol Use Yes     Social History     Substance and Sexual Activity   Drug Use No     Social History     Tobacco Use   Smoking Status Never   Smokeless Tobacco Never     History reviewed. No pertinent family history.  Health Maintenance Due   Topic    Hepatitis C Screening     Pneumococcal Vaccine: Pediatrics (0 to 5 Years) and At-Risk Patients (6 to 64 Years) (1 of 2 - PCV)    DM Eye Exam     HIV Screening     Zoster Vaccine (1 of 2)    RSV Vaccine Age 60+ Years (1 - 1-dose 60+ series)    COVID-19 Vaccine (1 - 2023-24 season)    Influenza Vaccine (1)      Meds/Allergies       Current Outpatient Medications:     losartan (COZAAR) 25 mg tablet, Take 1 tablet (25 mg total) by mouth daily, Disp: 90 tablet, Rfl: 1    metFORMIN (GLUCOPHAGE-XR) 500 mg 24 hr tablet, Take 2 tablets (1,000 mg total) by mouth daily with breakfast, Disp: 180 tablet, Rfl: 1    tamsulosin (FLOMAX) 0.4 mg, Take 1 capsule (0.4 mg total) by mouth daily with dinner, Disp: 90 capsule, Rfl: 1      Objective:    Vitals:   Pulse 100   Ht 5' 10\" (1.778 m)   Wt 86.8 kg (191 lb 6.4 oz)   SpO2 98%   BMI 27.46 kg/m²   Body mass index is 27.46 kg/m².  Vitals:    08/20/24 1619   Weight: 86.8 kg (191 lb 6.4 oz)       Physical Exam  Vitals and nursing note reviewed.   Constitutional:       Appearance: Normal appearance.   Cardiovascular:      Rate and Rhythm: " Normal rate and regular rhythm.      Heart sounds: Normal heart sounds.   Pulmonary:      Effort: Pulmonary effort is normal.      Breath sounds: Normal breath sounds.   Abdominal:      Palpations: Abdomen is soft.   Musculoskeletal:         General: Normal range of motion.      Right lower leg: No edema.      Left lower leg: No edema.   Skin:     General: Skin is warm and dry.      Capillary Refill: Capillary refill takes less than 2 seconds.   Neurological:      General: No focal deficit present.      Mental Status: He is alert and oriented to person, place, and time.   Psychiatric:         Mood and Affect: Mood normal.         Behavior: Behavior normal.         Lab Review   No visits with results within 2 Month(s) from this visit.   Latest known visit with results is:   Office Visit on 06/12/2024   Component Date Value Ref Range Status    Glucose, Random 07/03/2024 155 (H)  70 - 99 mg/dL Final    BUN 07/03/2024 25  8 - 27 mg/dL Final    Creatinine 07/03/2024 1.00  0.76 - 1.27 mg/dL Final    eGFR 07/03/2024 86  >59 mL/min/1.73 Final    SL AMB BUN/CREATININE RATIO 07/03/2024 25 (H)  10 - 24 Final    Sodium 07/03/2024 142  134 - 144 mmol/L Final    Potassium 07/03/2024 4.4  3.5 - 5.2 mmol/L Final    Chloride 07/03/2024 105  96 - 106 mmol/L Final    CO2 07/03/2024 22  20 - 29 mmol/L Final    CALCIUM 07/03/2024 9.6  8.6 - 10.2 mg/dL Final    Protein, Total 07/03/2024 6.5  6.0 - 8.5 g/dL Final    Albumin 07/03/2024 4.6  3.9 - 4.9 g/dL Final    Globulin, Total 07/03/2024 1.9  1.5 - 4.5 g/dL Final    TOTAL BILIRUBIN 07/03/2024 0.5  0.0 - 1.2 mg/dL Final    Alk Phos Isoenzymes 07/03/2024 65  44 - 121 IU/L Final    AST 07/03/2024 22  0 - 40 IU/L Final    ALT 07/03/2024 28  0 - 44 IU/L Final    Cholesterol, Total 07/03/2024 175  100 - 199 mg/dL Final    Triglycerides 07/03/2024 83  0 - 149 mg/dL Final    HDL 07/03/2024 37 (L)  >39 mg/dL Final    VLDL Cholesterol Calculated 07/03/2024 16  5 - 40 mg/dL Final    LDL Calculated  "07/03/2024 122 (H)  0 - 99 mg/dL Final    T. Chol/HDL Ratio 07/03/2024 4.7  0.0 - 5.0 ratio Final    Comment:                                   T. Chol/HDL Ratio                                              Men  Women                                1/2 Avg.Risk  3.4    3.3                                    Avg.Risk  5.0    4.4                                 2X Avg.Risk  9.6    7.1                                 3X Avg.Risk 23.4   11.0      Hemoglobin A1C 07/03/2024 6.6 (H)  4.8 - 5.6 % Final    Comment:          Prediabetes: 5.7 - 6.4           Diabetes: >6.4           Glycemic control for adults with diabetes: <7.0      Estimated Average Glucose 07/03/2024 143  mg/dL Final    TSH 07/03/2024 2.060  0.450 - 4.500 uIU/mL Final    25-HYDROXY VIT D 07/03/2024 35.0  30.0 - 100.0 ng/mL Final    Comment: Vitamin D deficiency has been defined by the [place] of  [employer] and an Endocrine Society practice guideline as a  level of serum 25-OH vitamin D less than 20 ng/mL (1,2).  The Endocrine Society went on to further define vitamin D  insufficiency as a level between 21 and 29 ng/mL (2).  1. IOM ([place] of [employer]). 2010. Dietary reference     intakes for calcium and D. [place]: The     National Academies Press.  2. Amanda MF, Nancie VERGARA, Mary Ellen HOFFMANN, et al.     Evaluation, treatment, and prevention of vitamin D     deficiency: an Endocrine Society clinical practice     guideline. JCEM. 2011 Jul; 96(7):1911-30.           Monty Mitchell MD        \"This note has been constructed using a voice recognition system.Therefore there may be syntax, spelling, and/or grammatical errors. Please call if you have any questions. \"  "

## 2024-08-20 NOTE — ASSESSMENT & PLAN NOTE
Patient blood pressure is much better 124/76 continue with losartan 25 mg daily patient had developed side effects with the lisinopril with cough which has been discontinued

## 2024-08-20 NOTE — ASSESSMENT & PLAN NOTE
Patient with cholesterol of 187 triglycerides 356 HDL 31 LDL 96.  After starting the medication for his diabetes which is under control now his lipid profile now shows cholesterol at 175 triglycerides 83 HDL 37 LDL at 122.  Next visit will start the patient on lipid-lowering agent also.

## 2024-08-20 NOTE — ASSESSMENT & PLAN NOTE
Patient with elevated PSA levels seen by the urologist whose note I have appreciated no intervention at present time he does have findings of BPH however will do a free and total PSA levels and look at the trend based on that we will make decisions regarding follow-up with a urologist or not.

## 2024-08-20 NOTE — ASSESSMENT & PLAN NOTE
Continue with tamsulosin 0.4 mg daily for his BPH he was seen by the urologist to recommend to continue the same for now.  Patient also doing much better with the medication symptom wise

## 2024-08-22 NOTE — TELEPHONE ENCOUNTER
Upon review of the In Basket request we  never got any response from [facility]    Any additional questions or concerns should be emailed to the Practice Liaisons via the appropriate education email address, please do not reply via In Basket.    Thank you  Nikki Greene MA   [facility]

## 2024-11-08 LAB
LEFT EYE DIABETIC RETINOPATHY: NORMAL
RIGHT EYE DIABETIC RETINOPATHY: NORMAL

## 2024-11-17 ENCOUNTER — APPOINTMENT (OUTPATIENT)
Dept: LAB | Facility: HOSPITAL | Age: 64
End: 2024-11-17
Attending: INTERNAL MEDICINE
Payer: COMMERCIAL

## 2024-11-17 DIAGNOSIS — Z13.0 SCREENING FOR DEFICIENCY ANEMIA: ICD-10-CM

## 2024-11-17 DIAGNOSIS — R73.03 PREDIABETES: ICD-10-CM

## 2024-11-17 LAB
BASOPHILS # BLD AUTO: 0.02 THOUSANDS/ÂΜL (ref 0–0.1)
BASOPHILS NFR BLD AUTO: 0 % (ref 0–1)
BILIRUB UR QL STRIP: NEGATIVE
CLARITY UR: CLEAR
COLOR UR: YELLOW
EOSINOPHIL # BLD AUTO: 0.08 THOUSAND/ÂΜL (ref 0–0.61)
EOSINOPHIL NFR BLD AUTO: 2 % (ref 0–6)
ERYTHROCYTE [DISTWIDTH] IN BLOOD BY AUTOMATED COUNT: 13.2 % (ref 11.6–15.1)
GLUCOSE UR STRIP-MCNC: NEGATIVE MG/DL
HCT VFR BLD AUTO: 48.6 % (ref 36.5–49.3)
HGB BLD-MCNC: 15.7 G/DL (ref 12–17)
HGB UR QL STRIP.AUTO: NEGATIVE
IMM GRANULOCYTES # BLD AUTO: 0.01 THOUSAND/UL (ref 0–0.2)
IMM GRANULOCYTES NFR BLD AUTO: 0 % (ref 0–2)
KETONES UR STRIP-MCNC: NEGATIVE MG/DL
LEUKOCYTE ESTERASE UR QL STRIP: NEGATIVE
LYMPHOCYTES # BLD AUTO: 0.91 THOUSANDS/ÂΜL (ref 0.6–4.47)
LYMPHOCYTES NFR BLD AUTO: 19 % (ref 14–44)
MCH RBC QN AUTO: 28.3 PG (ref 26.8–34.3)
MCHC RBC AUTO-ENTMCNC: 32.3 G/DL (ref 31.4–37.4)
MCV RBC AUTO: 88 FL (ref 82–98)
MONOCYTES # BLD AUTO: 0.51 THOUSAND/ÂΜL (ref 0.17–1.22)
MONOCYTES NFR BLD AUTO: 11 % (ref 4–12)
NEUTROPHILS # BLD AUTO: 3.23 THOUSANDS/ÂΜL (ref 1.85–7.62)
NEUTS SEG NFR BLD AUTO: 68 % (ref 43–75)
NITRITE UR QL STRIP: NEGATIVE
NRBC BLD AUTO-RTO: 0 /100 WBCS
PH UR STRIP.AUTO: 7 [PH]
PLATELET # BLD AUTO: 156 THOUSANDS/UL (ref 149–390)
PMV BLD AUTO: 11.5 FL (ref 8.9–12.7)
PROT UR STRIP-MCNC: NEGATIVE MG/DL
RBC # BLD AUTO: 5.54 MILLION/UL (ref 3.88–5.62)
SP GR UR STRIP.AUTO: 1.02 (ref 1–1.03)
UROBILINOGEN UR STRIP-ACNC: <2 MG/DL
WBC # BLD AUTO: 4.76 THOUSAND/UL (ref 4.31–10.16)

## 2024-11-17 PROCEDURE — 85025 COMPLETE CBC W/AUTO DIFF WBC: CPT

## 2024-11-17 PROCEDURE — 81003 URINALYSIS AUTO W/O SCOPE: CPT

## 2024-11-22 NOTE — PROGRESS NOTES
Office Visit Note  24     Bon Snyder 64 y.o. male MRN: [id number]  : 1960    Assessment:     1. Type 2 diabetes mellitus without complication, without long-term current use of insulin ([facility])  Assessment & Plan:    Lab Results   Component Value Date    HGBA1C 6.2 (H) 2024   Globin A1c came down to 6.2 much better compared to before currently patient is taking metformin 1000 mg daily with breakfast will continue with the same follow-up with repeat hemoglobin A1c at a later date.  Orders:  -     metFORMIN (GLUCOPHAGE-XR) 500 mg 24 hr tablet; Take 2 tablets (1,000 mg total) by mouth daily with breakfast  -     Albumin / creatinine urine ratio; Future  -     Albumin / creatinine urine ratio  2. Elevated PSA  Assessment & Plan:  Patient's PSA level came back slightly high at 5.962 it was 5.7 before Free PSA 0.871 and free percentage PSA is 14.609 I will forward these labs to the urologist for him to evaluate and if he feels he needs to have him seen by him again he will make an appointment.  3. Primary hypertension  Assessment & Plan:  Patient blood pressure is stable 120/74 continue with losartan 25 mg daily  Orders:  -     losartan (COZAAR) 25 mg tablet; Take 1 tablet (25 mg total) by mouth daily  4. Benign prostatic hyperplasia with incomplete bladder emptying  Assessment & Plan:  Continue with tamsulosin 0.4 mg daily appears to be helping a lot we will continue  5. Dyslipidemia  Assessment & Plan:  Lipid profile shows cholesterol 164 triglycerides 105 HDL 35 and LDL at 98 we will start the patient on small dose of rosuvastatin 5 mg at bedtime and follow-up with repeat lipid profile at later date.  Orders:  -     rosuvastatin (CRESTOR) 5 mg tablet; Take 1 tablet (5 mg total) by mouth daily  -     Lipid panel; Future  -     Lipid panel  6. Cortical age-related cataract of right eye  Assessment & Plan:  Patient has undergone surgery for the cataract doing much better with the vision of the right  eye the left eye is not ready yet for the surgery.  7. Prediabetes  -     Comprehensive metabolic panel; Future  -     Hemoglobin A1C; Future; Expected date: 11/25/2024  -     Comprehensive metabolic panel  -     Hemoglobin A1C             Discussion Summary and Plan:  Today's care plan and medications were reviewed with patient in detail and all their questions answered to their satisfaction.    Chief Complaint   Patient presents with    Follow-up      Subjective:  Patient is coming here for a follow-up evaluation with regards to symptoms of diabetes, hypertension, BPH.  Patient was seen by the urologist in the recent past regarding elevated PSA and at present time he did not want to do any kind of intervention.  I reviewed the notes from the urologist.  Also reviewed the lab work medications.  Patient is feeling in in general good sugars are under control.        The following portions of the patient's history were reviewed and updated as appropriate: allergies, current medications, past family history, past medical history, past social history, past surgical history and problem list.    Review of Systems   Constitutional:  Negative for chills and fever.   HENT:  Negative for ear pain and sore throat.    Eyes:  Negative for pain and visual disturbance.   Respiratory:  Negative for cough and shortness of breath.    Cardiovascular:  Negative for chest pain and palpitations.   Gastrointestinal:  Negative for abdominal pain and vomiting.   Genitourinary:  Negative for dysuria and hematuria.   Musculoskeletal:  Negative for arthralgias and back pain.   Skin:  Negative for color change and rash.   Neurological:  Negative for seizures and syncope.   All other systems reviewed and are negative.        Historical Information   Patient Active Problem List   Diagnosis    Benign prostatic hyperplasia with incomplete bladder emptying    Primary hypertension    Type 2 diabetes mellitus without complication, without long-term  "current use of insulin (HCC)    Dyslipidemia    Hyperkalemia    Elevated PSA    Cortical age-related cataract of right eye     Past Medical History:   Diagnosis Date    Gout      Past Surgical History:   Procedure Laterality Date    NO PAST SURGERIES       Social History     Substance and Sexual Activity   Alcohol Use Yes     Social History     Substance and Sexual Activity   Drug Use No     Social History     Tobacco Use   Smoking Status Never   Smokeless Tobacco Never     History reviewed. No pertinent family history.  Health Maintenance Due   Topic    Hepatitis C Screening     Pneumococcal Vaccine: Pediatrics (0 to 5 Years) and At-Risk Patients (6 to 64 Years) (1 of 2 - PCV)    DM Eye Exam     HIV Screening     Zoster Vaccine (1 of 2)    Influenza Vaccine (1)    COVID-19 Vaccine (1 - 2024-25 season)    Depression Screening       Meds/Allergies       Current Outpatient Medications:     losartan (COZAAR) 25 mg tablet, Take 1 tablet (25 mg total) by mouth daily, Disp: 90 tablet, Rfl: 1    metFORMIN (GLUCOPHAGE-XR) 500 mg 24 hr tablet, Take 2 tablets (1,000 mg total) by mouth daily with breakfast, Disp: 180 tablet, Rfl: 1    rosuvastatin (CRESTOR) 5 mg tablet, Take 1 tablet (5 mg total) by mouth daily, Disp: 30 tablet, Rfl: 5    tamsulosin (FLOMAX) 0.4 mg, Take 1 capsule (0.4 mg total) by mouth daily with dinner, Disp: 90 capsule, Rfl: 1      Objective:    Vitals:   /74 (BP Location: Right arm, Patient Position: Sitting, Cuff Size: Standard)   Pulse 89   Ht 5' 10\" (1.778 m)   Wt 88.3 kg (194 lb 9.6 oz)   SpO2 99%   BMI 27.92 kg/m²   Body mass index is 27.92 kg/m².  Vitals:    11/25/24 1620   Weight: 88.3 kg (194 lb 9.6 oz)       Physical Exam  Vitals and nursing note reviewed.   Constitutional:       Appearance: Normal appearance.   Cardiovascular:      Rate and Rhythm: Normal rate and regular rhythm.      Heart sounds: Normal heart sounds.   Pulmonary:      Effort: Pulmonary effort is normal.      Breath " sounds: Normal breath sounds.   Abdominal:      Palpations: Abdomen is soft.   Musculoskeletal:      Right lower leg: No edema.      Left lower leg: No edema.   Skin:     General: Skin is warm and dry.   Neurological:      General: No focal deficit present.      Mental Status: He is alert and oriented to person, place, and time.   Psychiatric:         Mood and Affect: Mood normal.         Behavior: Behavior normal.         Lab Review   Transcribe Orders on 11/17/2024   Component Date Value Ref Range Status    PSA, Diagnostic 11/17/2024 5.962 (H)  0.000 - 4.000 ng/mL Final    "[employer] Access chemiluminescent immunoassay. Confirm baseline values for patients being serially monitored.    PSA, Free 11/17/2024 0.871  ng/mL Final    PSA % Free 11/17/2024 14.609  % Final    Creatinine, Ur 11/17/2024 129.1  Reference range not established. mg/dL Final    Albumin,U,Random 11/17/2024 8.3  <20.0 mg/L Final    Albumin Creat Ratio 11/17/2024 6  0 - 30 mg/g creatinine Final    Cholesterol 11/17/2024 164  See Comment mg/dL Final    Cholesterol:         Pediatric <18 Years        Desirable          <170 mg/dL      Borderline High    170-199 mg/dL      High               >=200 mg/dL        Adult >=18 Years            Desirable         <200 mg/dL      Borderline High   200-239 mg/dL      High              >239 mg/dL      Triglycerides 11/17/2024 105  See Comment mg/dL Final    Triglyceride:     0-9Y            <75mg/dL     10Y-17Y         <90 mg/dL       >=18Y     Normal          <150 mg/dL     Borderline High 150-199 mg/dL     High            200-499 mg/dL        Very High       >499 mg/dL    Specimen collection should occur prior to Metamizole administration due to the potential for falsely depressed results.    HDL, Direct 11/17/2024 35 (L)  >=40 mg/dL Final    LDL Calculated 11/17/2024 108 (H)  0 - 100 mg/dL Final    LDL Cholesterol:     Optimal           <100 mg/dl     Near Optimal      100-129 mg/dl     Above Optimal        Borderline High 130-159 mg/dl       High            160-189 mg/dl       Very High       >189 mg/dl         This screening LDL is a calculated result.   It does not have the accuracy of the Direct Measured LDL in the monitoring of patients with hyperlipidemia and/or statin therapy.   Direct Measure LDL (XLP276) must be ordered separately in these patients.    Non-HDL-Chol (CHOL-HDL) 11/17/2024 129  mg/dl Final    Sodium 11/17/2024 142  135 - 147 mmol/L Final    Potassium 11/17/2024 4.2  3.5 - 5.3 mmol/L Final    Chloride 11/17/2024 105  96 - 108 mmol/L Final    CO2 11/17/2024 32  21 - 32 mmol/L Final    ANION GAP 11/17/2024 5  4 - 13 mmol/L Final    BUN 11/17/2024 16  5 - 25 mg/dL Final    Creatinine 11/17/2024 1.04  0.60 - 1.30 mg/dL Final    Standardized to IDMS reference method    Glucose, Fasting 11/17/2024 135 (H)  65 - 99 mg/dL Final    Calcium 11/17/2024 9.4  8.4 - 10.2 mg/dL Final    AST 11/17/2024 16  13 - 39 U/L Final    ALT 11/17/2024 26  7 - 52 U/L Final    Specimen collection should occur prior to Sulfasalazine administration due to the potential for falsely depressed results.     Alkaline Phosphatase 11/17/2024 49  34 - 104 U/L Final    Total Protein 11/17/2024 6.5  6.4 - 8.4 g/dL Final    Albumin 11/17/2024 4.6  3.5 - 5.0 g/dL Final    Total Bilirubin 11/17/2024 0.76  0.20 - 1.00 mg/dL Final    Use of this assay is not recommended for patients undergoing treatment with eltrombopag due to the potential for falsely elevated results.  N-acetyl-p-benzoquinone imine (metabolite of Acetaminophen) will generate erroneously low results in samples for patients that have taken an overdose of Acetaminophen.    eGFR 11/17/2024 76  ml/min/1.73sq m Final    Hemoglobin A1C 11/17/2024 6.2 (H)  Normal 4.0-5.6%; PreDiabetic 5.7-6.4%; Diabetic >=6.5%; Glycemic control for adults with diabetes <7.0% % Final    EAG 11/17/2024 131  mg/dl Final   Appointment on 11/17/2024   Component Date Value Ref Range Status    WBC  "11/17/2024 4.76  4.31 - 10.16 Thousand/uL Final    RBC 11/17/2024 5.54  3.88 - 5.62 Million/uL Final    Hemoglobin 11/17/2024 15.7  12.0 - 17.0 g/dL Final    Hematocrit 11/17/2024 48.6  36.5 - 49.3 % Final    MCV 11/17/2024 88  82 - 98 fL Final    MCH 11/17/2024 28.3  26.8 - 34.3 pg Final    MCHC 11/17/2024 32.3  31.4 - 37.4 g/dL Final    RDW 11/17/2024 13.2  11.6 - 15.1 % Final    MPV 11/17/2024 11.5  8.9 - 12.7 fL Final    Platelets 11/17/2024 156  149 - 390 Thousands/uL Final    nRBC 11/17/2024 0  /100 WBCs Final    Segmented % 11/17/2024 68  43 - 75 % Final    Immature Grans % 11/17/2024 0  0 - 2 % Final    Lymphocytes % 11/17/2024 19  14 - 44 % Final    Monocytes % 11/17/2024 11  4 - 12 % Final    Eosinophils Relative 11/17/2024 2  0 - 6 % Final    Basophils Relative 11/17/2024 0  0 - 1 % Final    Absolute Neutrophils 11/17/2024 3.23  1.85 - 7.62 Thousands/µL Final    Absolute Immature Grans 11/17/2024 0.01  0.00 - 0.20 Thousand/uL Final    Absolute Lymphocytes 11/17/2024 0.91  0.60 - 4.47 Thousands/µL Final    Absolute Monocytes 11/17/2024 0.51  0.17 - 1.22 Thousand/µL Final    Eosinophils Absolute 11/17/2024 0.08  0.00 - 0.61 Thousand/µL Final    Basophils Absolute 11/17/2024 0.02  0.00 - 0.10 Thousands/µL Final    Color, UA 11/17/2024 Yellow   Final    Clarity, UA 11/17/2024 Clear   Final    Specific Gravity, UA 11/17/2024 1.020  1.005 - 1.030 Final    pH, UA 11/17/2024 7.0  4.5, 5.0, 5.5, 6.0, 6.5, 7.0, 7.5, 8.0 Final    Leukocytes, UA 11/17/2024 Negative  Negative Final    Nitrite, UA 11/17/2024 Negative  Negative Final    Protein, UA 11/17/2024 Negative  Negative mg/dl Final    Glucose, UA 11/17/2024 Negative  Negative mg/dl Final    Ketones, UA 11/17/2024 Negative  Negative mg/dl Final    Urobilinogen, UA 11/17/2024 <2.0  <2.0 mg/dl mg/dl Final    Bilirubin, UA 11/17/2024 Negative  Negative Final    Occult Blood, UA 11/17/2024 Negative  Negative Final         Monty Mitchell MD        \"This note has " "been constructed using a voice recognition system.Therefore there may be syntax, spelling, and/or grammatical errors. Please call if you have any questions. \"  "

## 2024-11-25 ENCOUNTER — OFFICE VISIT (OUTPATIENT)
Dept: FAMILY MEDICINE CLINIC | Facility: CLINIC | Age: 64
End: 2024-11-25
Payer: COMMERCIAL

## 2024-11-25 VITALS
WEIGHT: 194.6 LBS | BODY MASS INDEX: 27.86 KG/M2 | HEART RATE: 89 BPM | OXYGEN SATURATION: 99 % | DIASTOLIC BLOOD PRESSURE: 74 MMHG | HEIGHT: 70 IN | SYSTOLIC BLOOD PRESSURE: 120 MMHG

## 2024-11-25 DIAGNOSIS — E11.9 TYPE 2 DIABETES MELLITUS WITHOUT COMPLICATION, WITHOUT LONG-TERM CURRENT USE OF INSULIN (HCC): Primary | ICD-10-CM

## 2024-11-25 DIAGNOSIS — H25.011 CORTICAL AGE-RELATED CATARACT OF RIGHT EYE: ICD-10-CM

## 2024-11-25 DIAGNOSIS — R39.14 BENIGN PROSTATIC HYPERPLASIA WITH INCOMPLETE BLADDER EMPTYING: ICD-10-CM

## 2024-11-25 DIAGNOSIS — I10 PRIMARY HYPERTENSION: ICD-10-CM

## 2024-11-25 DIAGNOSIS — R73.03 PREDIABETES: ICD-10-CM

## 2024-11-25 DIAGNOSIS — N40.1 BENIGN PROSTATIC HYPERPLASIA WITH INCOMPLETE BLADDER EMPTYING: ICD-10-CM

## 2024-11-25 DIAGNOSIS — R97.20 ELEVATED PSA: ICD-10-CM

## 2024-11-25 DIAGNOSIS — E78.5 DYSLIPIDEMIA: ICD-10-CM

## 2024-11-25 PROCEDURE — 99214 OFFICE O/P EST MOD 30 MIN: CPT | Performed by: INTERNAL MEDICINE

## 2024-11-25 RX ORDER — ROSUVASTATIN CALCIUM 5 MG/1
5 TABLET, COATED ORAL DAILY
Qty: 30 TABLET | Refills: 5 | Status: SHIPPED | OUTPATIENT
Start: 2024-11-25

## 2024-11-25 RX ORDER — LOSARTAN POTASSIUM 25 MG/1
25 TABLET ORAL DAILY
Qty: 90 TABLET | Refills: 1 | Status: SHIPPED | OUTPATIENT
Start: 2024-11-25

## 2024-11-25 RX ORDER — METFORMIN HYDROCHLORIDE 500 MG/1
1000 TABLET, EXTENDED RELEASE ORAL
Qty: 180 TABLET | Refills: 1 | Status: SHIPPED | OUTPATIENT
Start: 2024-11-25

## 2024-11-25 NOTE — ASSESSMENT & PLAN NOTE
Patient has undergone surgery for the cataract doing much better with the vision of the right eye the left eye is not ready yet for the surgery.

## 2024-11-25 NOTE — ASSESSMENT & PLAN NOTE
Lab Results   Component Value Date    HGBA1C 6.2 (H) 11/17/2024   Globin A1c came down to 6.2 much better compared to before currently patient is taking metformin 1000 mg daily with breakfast will continue with the same follow-up with repeat hemoglobin A1c at a later date.

## 2024-11-25 NOTE — ASSESSMENT & PLAN NOTE
Patient's PSA level came back slightly high at 5.962 it was 5.7 before Free PSA 0.871 and free percentage PSA is 14.609 I will forward these labs to the urologist for him to evaluate and if he feels he needs to have him seen by him again he will make an appointment.

## 2024-11-25 NOTE — ASSESSMENT & PLAN NOTE
Lipid profile shows cholesterol 164 triglycerides 105 HDL 35 and LDL at 98 we will start the patient on small dose of rosuvastatin 5 mg at bedtime and follow-up with repeat lipid profile at later date.

## 2025-02-25 DIAGNOSIS — R39.14 BENIGN PROSTATIC HYPERPLASIA WITH INCOMPLETE BLADDER EMPTYING: ICD-10-CM

## 2025-02-25 DIAGNOSIS — N40.1 BENIGN PROSTATIC HYPERPLASIA WITH INCOMPLETE BLADDER EMPTYING: ICD-10-CM

## 2025-02-25 RX ORDER — TAMSULOSIN HYDROCHLORIDE 0.4 MG/1
0.4 CAPSULE ORAL
Qty: 90 CAPSULE | Refills: 1 | Status: SHIPPED | OUTPATIENT
Start: 2025-02-25

## 2025-03-04 ENCOUNTER — OFFICE VISIT (OUTPATIENT)
Dept: UROLOGY | Facility: CLINIC | Age: 65
End: 2025-03-04
Payer: COMMERCIAL

## 2025-03-04 VITALS
BODY MASS INDEX: 28.35 KG/M2 | HEART RATE: 117 BPM | DIASTOLIC BLOOD PRESSURE: 80 MMHG | OXYGEN SATURATION: 97 % | WEIGHT: 198 LBS | HEIGHT: 70 IN | SYSTOLIC BLOOD PRESSURE: 120 MMHG

## 2025-03-04 DIAGNOSIS — R97.20 ELEVATED PSA: ICD-10-CM

## 2025-03-04 DIAGNOSIS — N40.1 BENIGN PROSTATIC HYPERPLASIA WITH INCOMPLETE BLADDER EMPTYING: Primary | ICD-10-CM

## 2025-03-04 DIAGNOSIS — R39.14 BENIGN PROSTATIC HYPERPLASIA WITH INCOMPLETE BLADDER EMPTYING: Primary | ICD-10-CM

## 2025-03-04 PROCEDURE — 99213 OFFICE O/P EST LOW 20 MIN: CPT | Performed by: UROLOGY

## 2025-03-04 NOTE — PROGRESS NOTES
"Bon Snyder is a(n) 64 y.o. male. , :  1960    Subjective     Assessment:  The primary encounter diagnosis was Benign prostatic hyperplasia with incomplete bladder emptying. A diagnosis of Elevated PSA was also pertinent to this visit.  64 M PSA stable in November.  DM is better controlled with better diet.  No desire to jump to a biopsy or even an MRI but would be OK with CHRISTIAN after a PSA in Late May.    Plan: PSA in May and F/U after.  Continue with flomax and limit sugars.  Limit fluids before bed.     Radiology  none    Past Medical History  DM     History  Elevated PSA 5.2024.  Elevated A1c 10.3 a year ago  BPH on Flomax 0.4 mg daily since 2024.  Frequency, urgency, and hesitancy.  BPH on CHRISTINA.     Prior Visits  None     2024 AMISH Mack  63-year-old gentleman referred for elevated PSA.  His urination symptoms have improved significantly since starting the Flomax.  No side effects.  No hematuria or dysuria.  Gets up maybe once each night.  Rectal examination reveals the prostate is definitely enlarged probably about 60 g in volume without any specific nodules or tenderness.     Plan: Would continue on the Flomax since it seems to help him.  I will check a free and total PSA to see if it has improved since he has changed his diet considerably in the last few months.  If the PSA is still elevated and rising or the free and total percentage is reduced, he might need to submit to a prostate biopsy.  I would not recommend that immediately.    3/4/2025 AMISH Mack  64 M PSA stable in November.  DM is better controlled with better diet.  No desire to jump to a biopsy or even an MRI but would be OK with CHRISTINA after a PSA in Late May.    Plan: PSA in May and F/U after    Review of Systems    Lab Results   Component Value Date    PSA 5.962 (H) 2024    PSA 5.7 (H) 2024     No results found for: \"TESTOSTERONE\"  No components found for: \"CR\"  No results found for: " "\"HBA1C\"    Objective     /80 (BP Location: Left arm, Patient Position: Sitting, Cuff Size: Standard)   Pulse (!) 117   Ht 5' 10\" (1.778 m)   Wt 89.8 kg (198 lb)   SpO2 97%   BMI 28.41 kg/m²     Physical Exam      [facility]  "

## 2025-03-16 ENCOUNTER — APPOINTMENT (OUTPATIENT)
Dept: LAB | Facility: HOSPITAL | Age: 65
End: 2025-03-16
Attending: INTERNAL MEDICINE
Payer: COMMERCIAL

## 2025-03-16 DIAGNOSIS — R73.03 DIABETES MELLITUS, LATENT: ICD-10-CM

## 2025-03-16 DIAGNOSIS — E78.5 HYPERLIPIDEMIA, UNSPECIFIED HYPERLIPIDEMIA TYPE: ICD-10-CM

## 2025-03-16 DIAGNOSIS — E11.9 DIABETES MELLITUS WITHOUT COMPLICATION (HCC): ICD-10-CM

## 2025-03-16 DIAGNOSIS — R97.20 ELEVATED PROSTATE SPECIFIC ANTIGEN (PSA): Primary | ICD-10-CM

## 2025-03-16 LAB
ALBUMIN SERPL BCG-MCNC: 4.6 G/DL (ref 3.5–5)
ALP SERPL-CCNC: 55 U/L (ref 34–104)
ALT SERPL W P-5'-P-CCNC: 25 U/L (ref 7–52)
ANION GAP SERPL CALCULATED.3IONS-SCNC: 10 MMOL/L (ref 4–13)
AST SERPL W P-5'-P-CCNC: 16 U/L (ref 13–39)
BILIRUB SERPL-MCNC: 0.62 MG/DL (ref 0.2–1)
BUN SERPL-MCNC: 16 MG/DL (ref 5–25)
CALCIUM SERPL-MCNC: 9.2 MG/DL (ref 8.4–10.2)
CHLORIDE SERPL-SCNC: 105 MMOL/L (ref 96–108)
CHOLEST SERPL-MCNC: 128 MG/DL (ref ?–200)
CO2 SERPL-SCNC: 27 MMOL/L (ref 21–32)
CREAT SERPL-MCNC: 0.93 MG/DL (ref 0.6–1.3)
CREAT UR-MCNC: 125.8 MG/DL
EST. AVERAGE GLUCOSE BLD GHB EST-MCNC: 151 MG/DL
GFR SERPL CREATININE-BSD FRML MDRD: 87 ML/MIN/1.73SQ M
GLUCOSE P FAST SERPL-MCNC: 178 MG/DL (ref 65–99)
HBA1C MFR BLD: 6.9 %
HDLC SERPL-MCNC: 36 MG/DL
LDLC SERPL CALC-MCNC: 74 MG/DL (ref 0–100)
MICROALBUMIN UR-MCNC: 9 MG/L
MICROALBUMIN/CREAT 24H UR: 7 MG/G CREATININE (ref 0–30)
NONHDLC SERPL-MCNC: 92 MG/DL
POTASSIUM SERPL-SCNC: 5 MMOL/L (ref 3.5–5.3)
PROT SERPL-MCNC: 6.6 G/DL (ref 6.4–8.4)
PSA FREE MFR SERPL: 17.22 %
PSA FREE SERPL-MCNC: 0.91 NG/ML
PSA SERPL-MCNC: 5.31 NG/ML (ref 0–4)
SODIUM SERPL-SCNC: 142 MMOL/L (ref 135–147)
TRIGL SERPL-MCNC: 90 MG/DL (ref ?–150)

## 2025-03-16 PROCEDURE — 82570 ASSAY OF URINE CREATININE: CPT

## 2025-03-16 PROCEDURE — 84153 ASSAY OF PSA TOTAL: CPT

## 2025-03-16 PROCEDURE — 83036 HEMOGLOBIN GLYCOSYLATED A1C: CPT

## 2025-03-16 PROCEDURE — 82043 UR ALBUMIN QUANTITATIVE: CPT

## 2025-03-16 PROCEDURE — 84154 ASSAY OF PSA FREE: CPT

## 2025-03-16 PROCEDURE — 36415 COLL VENOUS BLD VENIPUNCTURE: CPT

## 2025-03-16 PROCEDURE — 80061 LIPID PANEL: CPT

## 2025-03-16 PROCEDURE — 80053 COMPREHEN METABOLIC PANEL: CPT

## 2025-04-07 NOTE — PROGRESS NOTES
Name: Bon Snyder      : 1960      MRN: [id number]  Encounter Provider: Monty Micthell MD  Encounter Date: 2025   [facility]  :  Assessment & Plan  Type 2 diabetes mellitus without complication, without long-term current use of insulin ([facility])    Lab Results   Component Value Date    HGBA1C 6.9 (H) 2025   Hemoglobin A1c went up to 6.9 from 6.2 currently patient is taking 2 tablets of metformin  mg total of 1000 mg with breakfast daily.  Patient is following the diet but still the A1c went up will increase the dose of the metformin to 2 tablets in the morning 1 in the evening with suppertime and follow-up.  Will get a repeat lab work done in 3 months from now.  Orders:  •  metFORMIN (GLUCOPHAGE-XR) 500 mg 24 hr tablet; Metformin  mg tablets 2 tablets in the morning with breakfast and 1 tablet with supper.  Patient takes total of 1500 mg daily  •  Hemoglobin A1C; Future  •  Albumin / creatinine urine ratio; Future  •  UA w Reflex to Microscopic w Reflex to Culture; Future    Benign prostatic hyperplasia with incomplete bladder emptying  Currently patient is taking tamsulosin 0.4 mg daily will continue with the same patient also has been seen by the urologist who recommended to continue the same.       Cortical age-related cataract of right eye         Dyslipidemia  Labs done on  showed cholesterol 128 triglycerides 90 HDL 36 LDL 74 continue with the current medication rosuvastatin 5 mg at bedtime.       Elevated PSA  As mentioned in the history of present illness patient is being followed with a urologist with periodic PSA levels the last 1 done on  showed PSA level of 5.307 slightly better compared to the 1 from November of 5.962.  Patient is going to have a repeat 1 done in May and the urologist will decide at that time whether to do the biopsy or not.       Primary hypertension  Patient blood pressure is stable today 80s  "125/76 on losartan 25 mg daily continue with the same.       Prediabetes    Orders:  •  Comprehensive metabolic panel; Future           History of Present Illness   Patient is coming here for a follow-up evaluation with regards to his diabetes, hypertension, dyslipidemia, BPH.  Patient with elevated PSA levels was seen by the urologist report appreciated patient is going to have a repeat PSA levels done in the month of May and then decision will be made regarding doing a biopsy or not.    Patient had lab work done which showed hemoglobin A1c of 6.9 it was 6.2 before patient is currently taking metformin  mg 2 tablets daily.  Patient in general is feeling good.    Medication reviewed labs reviewed patient had eye checkup done 3 months back we will get a copy of the same.  Patient status post cataract surgery done 5 months back on the right eye.      Review of Systems   Constitutional:  Negative for chills and fever.   HENT:  Negative for ear pain and sore throat.    Eyes:  Negative for pain and visual disturbance.   Respiratory:  Negative for cough and shortness of breath.    Cardiovascular:  Negative for chest pain and palpitations.   Gastrointestinal:  Negative for abdominal pain and vomiting.   Genitourinary:  Negative for dysuria and hematuria.   Musculoskeletal:  Negative for arthralgias and back pain.   Skin:  Negative for color change and rash.   Neurological:  Negative for seizures and syncope.   All other systems reviewed and are negative.      Objective   /76 (BP Location: Right arm, Patient Position: Sitting, Cuff Size: Standard)   Pulse 86   Ht 5' 10\" (1.778 m)   Wt 90.4 kg (199 lb 6.4 oz)   SpO2 98%   BMI 28.61 kg/m²      Physical Exam  Vitals and nursing note reviewed.   Constitutional:       General: He is not in acute distress.     Appearance: He is well-developed.   HENT:      Head: Normocephalic and atraumatic.   Eyes:      Conjunctiva/sclera: Conjunctivae normal.   Cardiovascular: "      Rate and Rhythm: Normal rate and regular rhythm.      Heart sounds: No murmur heard.  Pulmonary:      Effort: Pulmonary effort is normal. No respiratory distress.      Breath sounds: Normal breath sounds.   Abdominal:      Palpations: Abdomen is soft.      Tenderness: There is no abdominal tenderness.   Musculoskeletal:         General: No swelling.      Cervical back: Neck supple.   Skin:     General: Skin is warm and dry.      Capillary Refill: Capillary refill takes less than 2 seconds.   Neurological:      Mental Status: He is alert.   Psychiatric:         Mood and Affect: Mood normal.

## 2025-04-08 ENCOUNTER — OFFICE VISIT (OUTPATIENT)
Dept: FAMILY MEDICINE CLINIC | Facility: CLINIC | Age: 65
End: 2025-04-08
Payer: COMMERCIAL

## 2025-04-08 VITALS
OXYGEN SATURATION: 98 % | SYSTOLIC BLOOD PRESSURE: 125 MMHG | BODY MASS INDEX: 28.55 KG/M2 | DIASTOLIC BLOOD PRESSURE: 76 MMHG | WEIGHT: 199.4 LBS | HEIGHT: 70 IN | HEART RATE: 86 BPM

## 2025-04-08 DIAGNOSIS — E78.5 DYSLIPIDEMIA: ICD-10-CM

## 2025-04-08 DIAGNOSIS — H25.011 CORTICAL AGE-RELATED CATARACT OF RIGHT EYE: ICD-10-CM

## 2025-04-08 DIAGNOSIS — R73.03 PREDIABETES: ICD-10-CM

## 2025-04-08 DIAGNOSIS — R97.20 ELEVATED PSA: ICD-10-CM

## 2025-04-08 DIAGNOSIS — E11.9 TYPE 2 DIABETES MELLITUS WITHOUT COMPLICATION, WITHOUT LONG-TERM CURRENT USE OF INSULIN (HCC): Primary | ICD-10-CM

## 2025-04-08 DIAGNOSIS — R39.14 BENIGN PROSTATIC HYPERPLASIA WITH INCOMPLETE BLADDER EMPTYING: ICD-10-CM

## 2025-04-08 DIAGNOSIS — N40.1 BENIGN PROSTATIC HYPERPLASIA WITH INCOMPLETE BLADDER EMPTYING: ICD-10-CM

## 2025-04-08 DIAGNOSIS — I10 PRIMARY HYPERTENSION: ICD-10-CM

## 2025-04-08 PROCEDURE — 99214 OFFICE O/P EST MOD 30 MIN: CPT | Performed by: INTERNAL MEDICINE

## 2025-04-08 RX ORDER — METFORMIN HYDROCHLORIDE 500 MG/1
TABLET, EXTENDED RELEASE ORAL
Qty: 270 TABLET | Refills: 0 | Status: SHIPPED | OUTPATIENT
Start: 2025-04-08

## 2025-04-08 NOTE — ASSESSMENT & PLAN NOTE
As mentioned in the history of present illness patient is being followed with a urologist with periodic PSA levels the last 1 done on March 16 showed PSA level of 5.307 slightly better compared to the 1 from November of 5.962.  Patient is going to have a repeat 1 done in May and the urologist will decide at that time whether to do the biopsy or not.

## 2025-04-08 NOTE — ASSESSMENT & PLAN NOTE
Currently patient is taking tamsulosin 0.4 mg daily will continue with the same patient also has been seen by the urologist who recommended to continue the same.

## 2025-04-08 NOTE — ASSESSMENT & PLAN NOTE
Lab Results   Component Value Date    HGBA1C 6.9 (H) 03/16/2025   Hemoglobin A1c went up to 6.9 from 6.2 currently patient is taking 2 tablets of metformin  mg total of 1000 mg with breakfast daily.  Patient is following the diet but still the A1c went up will increase the dose of the metformin to 2 tablets in the morning 1 in the evening with suppertime and follow-up.  Will get a repeat lab work done in 3 months from now.  Orders:  •  metFORMIN (GLUCOPHAGE-XR) 500 mg 24 hr tablet; Metformin  mg tablets 2 tablets in the morning with breakfast and 1 tablet with supper.  Patient takes total of 1500 mg daily  •  Hemoglobin A1C; Future  •  Albumin / creatinine urine ratio; Future  •  UA w Reflex to Microscopic w Reflex to Culture; Future

## 2025-04-08 NOTE — ASSESSMENT & PLAN NOTE
Patient blood pressure is stable today 80s 125/76 on losartan 25 mg daily continue with the same.

## 2025-04-08 NOTE — ASSESSMENT & PLAN NOTE
Labs done on March 16 showed cholesterol 128 triglycerides 90 HDL 36 LDL 74 continue with the current medication rosuvastatin 5 mg at bedtime.

## 2025-04-09 ENCOUNTER — TELEPHONE (OUTPATIENT)
Dept: ADMINISTRATIVE | Facility: OTHER | Age: 65
End: 2025-04-09

## 2025-04-09 NOTE — LETTER
Diabetic Eye Exam Form    Date Requested: 25  Patient: Bon Snyder  Patient : 1960   Referring Provider: Monty Mitchell MD      DIABETIC Eye Exam Date _______________________________      Type of Exam MUST be documented for Diabetic Eye Exams. Please CHECK ONE.     Retinal Exam       Dilated Retinal Exam       OCT       Optomap-Iris Exam      Fundus Photography       Left Eye - Please check Retinopathy or No Retinopathy        Exam did show retinopathy    Exam did not show retinopathy       Right Eye - Please check Retinopathy or No Retinopathy       Exam did show retinopathy    Exam did not show retinopathy       Comments __________________________________________________________    Practice Providing Exam ______________________________________________    Exam Performed By (print name) _______________________________________      Provider Signature ___________________________________________________      These reports are needed for  compliance.    Please fax this completed form and a copy of the Diabetic Eye Exam report to the [facility] Based Department as soon as possible via Fax [phone], daisy [place]: Phone [phone]. Our office is located at [address] [place] [address].     We thank you for your assistance in treating our mutual patient.

## 2025-04-09 NOTE — TELEPHONE ENCOUNTER
Upon review of the In Basket request and the patient's chart, initial outreach has been made via fax to facility. Please see Contacts section for details.     Thank you  Liz Dawn MA

## 2025-04-09 NOTE — TELEPHONE ENCOUNTER
----- Message from Consuelo BELLE sent at 4/8/2025  4:15 PM EDT -----  Regarding: care gap request  04/08/25 4:15 PM    Hello, our patient attached above has had Diabetic Eye Exam completed/performed. Please assist in updating the patient chart by making an External outreach to [facility] facility located in [place], [place]. The date of service is sometime in January 2025 per patient.    Thank you,  Consuelo Pickett MA   [facility]

## 2025-04-09 NOTE — LETTER
Diabetic Eye Exam Form    Date Requested: 25  Patient: Bon Snyder  Patient : 1960   Referring Provider: Monty Mitchell MD      DIABETIC Eye Exam Date _______________________________      Type of Exam MUST be documented for Diabetic Eye Exams. Please CHECK ONE.     Retinal Exam       Dilated Retinal Exam       OCT       Optomap-Iris Exam      Fundus Photography       Left Eye - Please check Retinopathy or No Retinopathy        Exam did show retinopathy    Exam did not show retinopathy       Right Eye - Please check Retinopathy or No Retinopathy       Exam did show retinopathy    Exam did not show retinopathy       Comments __________________________________________________________    Practice Providing Exam ______________________________________________    Exam Performed By (print name) _______________________________________      Provider Signature ___________________________________________________      These reports are needed for  compliance.    Please fax this completed form and a copy of the Diabetic Eye Exam report to the [facility] Based Department as soon as possible via Fax [phone], [place]: Phone [phone]. Our office is located at [address] [place] [address].     We thank you for your assistance in treating our mutual patient.

## 2025-04-14 NOTE — TELEPHONE ENCOUNTER
As a follow-up, a second attempt has been made for outreach via fax to facility. Please see Contacts section for details.    Thank you  Liz Dawn MA

## 2025-04-15 NOTE — TELEPHONE ENCOUNTER
Upon review of the In Basket request we were able to locate, review, and update the patient chart as requested for Diabetic Eye Exam.    Any additional questions or concerns should be emailed to the Practice Liaisons via the appropriate education email address, please do not reply via In Basket.    Thank you  Liz Dawn MA   [facility]

## 2025-05-24 DIAGNOSIS — E78.5 DYSLIPIDEMIA: ICD-10-CM

## 2025-05-24 DIAGNOSIS — I10 PRIMARY HYPERTENSION: ICD-10-CM

## 2025-05-25 RX ORDER — LOSARTAN POTASSIUM 25 MG/1
25 TABLET ORAL DAILY
Qty: 90 TABLET | Refills: 1 | Status: SHIPPED | OUTPATIENT
Start: 2025-05-25

## 2025-05-25 RX ORDER — ROSUVASTATIN CALCIUM 5 MG/1
5 TABLET, COATED ORAL DAILY
Qty: 90 TABLET | Refills: 1 | Status: SHIPPED | OUTPATIENT
Start: 2025-05-25

## 2025-06-14 ENCOUNTER — OFFICE VISIT (OUTPATIENT)
Dept: URGENT CARE | Facility: CLINIC | Age: 65
End: 2025-06-14
Payer: COMMERCIAL

## 2025-06-14 VITALS
WEIGHT: 193 LBS | SYSTOLIC BLOOD PRESSURE: 135 MMHG | BODY MASS INDEX: 27.69 KG/M2 | TEMPERATURE: 96.6 F | OXYGEN SATURATION: 99 % | HEART RATE: 70 BPM | RESPIRATION RATE: 16 BRPM | DIASTOLIC BLOOD PRESSURE: 78 MMHG

## 2025-06-14 DIAGNOSIS — H57.89 EYE IRRITATION: Primary | ICD-10-CM

## 2025-06-14 PROCEDURE — 99213 OFFICE O/P EST LOW 20 MIN: CPT | Performed by: PREVENTIVE MEDICINE

## 2025-06-14 RX ORDER — TETRACAINE HYDROCHLORIDE 5 MG/ML
2 SOLUTION OPHTHALMIC ONCE
Status: COMPLETED | OUTPATIENT
Start: 2025-06-14 | End: 2025-06-14

## 2025-06-14 RX ORDER — GENTAMICIN SULFATE 3 MG/ML
1 SOLUTION/ DROPS OPHTHALMIC EVERY 4 HOURS
Qty: 5 ML | Refills: 0 | Status: SHIPPED | OUTPATIENT
Start: 2025-06-14

## 2025-06-14 RX ADMIN — TETRACAINE HYDROCHLORIDE 2 DROP: 5 SOLUTION OPHTHALMIC at 13:22

## 2025-06-14 NOTE — PATIENT INSTRUCTIONS
Use the eyedrops as directed.  If the eye feels irritated today you can tape it shut to stop the irritation of blinking.  Recheck if no improvement 2 to 3 days

## 2025-06-14 NOTE — PROGRESS NOTES
[facility]        NAME: Bon Snyder is a 64 y.o. male  : 1960    MRN: [id number]  DATE: 2025  TIME: 10:40 AM    Assessment and Plan   Eye irritation [H57.89]  1. Eye irritation  gentamicin (GARAMYCIN) 0.3 % ophthalmic solution            Patient Instructions       Follow up with PCP in 3-5 days.  Proceed to  ER if symptoms worsen.    If tests have been performed at [facility], our office will contact you with results if changes need to be made to the care plan discussed with you at the visit.  You can review your full results on [facility] MyChart.    Chief Complaint     Chief Complaint   Patient presents with    Foreign Body in Eye     Reports getting a piece of wood particle in left eye yesterday and unsure if it came out. Now having pain, redness and swelling that has improved from yesterday. Eye is also draining. Flushed yesterday 2-3 times and used eye irritation drops.         History of Present Illness       Cutting wood yesterday felt like got something in his left eye.  Today the eye feels irritated    Foreign Body in Eye        Review of Systems   Review of Systems   Eyes:  Positive for pain.         Current Medications     Current Medications[1]    Current Allergies     Allergies as of 2025    (No Known Allergies)            The following portions of the patient's history were reviewed and updated as appropriate: allergies, current medications, past family history, past medical history, past social history, past surgical history and problem list.     Past Medical History[2]    Past Surgical History[3]    Family History[4]      Medications have been verified.        Objective   /78   Pulse 70   Temp (!) 96.6 °F (35.9 °C) (Tympanic)   Resp 16   Wt 87.5 kg (193 lb)   SpO2 99%   BMI 27.69 kg/m²   No LMP for male patient.       Physical Exam     Physical Exam    Eyes:      Comments: No foreign body detected on the surface of the eye under the upper lid or in the  lower lid       Procedures     The eye was numbed and fluorescein dye put in.  No corneal abrasions could be detected with the Woods light.             [1]   Current Outpatient Medications:     gentamicin (GARAMYCIN) 0.3 % ophthalmic solution, Administer 1 drop to the right eye every 4 (four) hours, Disp: 5 mL, Rfl: 0    losartan (COZAAR) 25 mg tablet, TAKE 1 TABLET (25 MG TOTAL) BY MOUTH DAILY., Disp: 90 tablet, Rfl: 1    metFORMIN (GLUCOPHAGE-XR) 500 mg 24 hr tablet, Metformin  mg tablets 2 tablets in the morning with breakfast and 1 tablet with supper.  Patient takes total of 1500 mg daily, Disp: 270 tablet, Rfl: 0    rosuvastatin (CRESTOR) 5 mg tablet, TAKE 1 TABLET (5 MG TOTAL) BY MOUTH DAILY., Disp: 90 tablet, Rfl: 1    tamsulosin (FLOMAX) 0.4 mg, TAKE 1 CAPSULE BY MOUTH EVERY DAY WITH DINNER, Disp: 90 capsule, Rfl: 1  [2]   Past Medical History:  Diagnosis Date    Diabetes mellitus (HCC)     Gout    [3]   Past Surgical History:  Procedure Laterality Date    NO PAST SURGERIES     [4] No family history on file.

## 2025-06-29 ENCOUNTER — APPOINTMENT (OUTPATIENT)
Dept: LAB | Facility: HOSPITAL | Age: 65
End: 2025-06-29
Attending: INTERNAL MEDICINE
Payer: COMMERCIAL

## 2025-06-29 DIAGNOSIS — E11.9 TYPE 2 DIABETES MELLITUS WITHOUT COMPLICATION, WITHOUT LONG-TERM CURRENT USE OF INSULIN (HCC): ICD-10-CM

## 2025-06-29 DIAGNOSIS — R73.03 PREDIABETES: ICD-10-CM

## 2025-06-29 LAB
ALBUMIN SERPL BCG-MCNC: 4.5 G/DL (ref 3.5–5)
ALP SERPL-CCNC: 56 U/L (ref 34–104)
ALT SERPL W P-5'-P-CCNC: 21 U/L (ref 7–52)
ANION GAP SERPL CALCULATED.3IONS-SCNC: 6 MMOL/L (ref 4–13)
AST SERPL W P-5'-P-CCNC: 16 U/L (ref 13–39)
BILIRUB SERPL-MCNC: 0.67 MG/DL (ref 0.2–1)
BILIRUB UR QL STRIP: NEGATIVE
BUN SERPL-MCNC: 21 MG/DL (ref 5–25)
CALCIUM SERPL-MCNC: 9.2 MG/DL (ref 8.4–10.2)
CHLORIDE SERPL-SCNC: 106 MMOL/L (ref 96–108)
CLARITY UR: CLEAR
CO2 SERPL-SCNC: 30 MMOL/L (ref 21–32)
COLOR UR: YELLOW
CREAT SERPL-MCNC: 0.95 MG/DL (ref 0.6–1.3)
CREAT UR-MCNC: 149.9 MG/DL
EST. AVERAGE GLUCOSE BLD GHB EST-MCNC: 140 MG/DL
GFR SERPL CREATININE-BSD FRML MDRD: 85 ML/MIN/1.73SQ M
GLUCOSE P FAST SERPL-MCNC: 151 MG/DL (ref 65–99)
GLUCOSE UR STRIP-MCNC: NEGATIVE MG/DL
HBA1C MFR BLD: 6.5 %
HGB UR QL STRIP.AUTO: NEGATIVE
KETONES UR STRIP-MCNC: NEGATIVE MG/DL
LEUKOCYTE ESTERASE UR QL STRIP: NEGATIVE
MICROALBUMIN UR-MCNC: 10.6 MG/L
MICROALBUMIN/CREAT 24H UR: 7 MG/G CREATININE (ref 0–30)
NITRITE UR QL STRIP: NEGATIVE
PH UR STRIP.AUTO: 6 [PH]
POTASSIUM SERPL-SCNC: 4.6 MMOL/L (ref 3.5–5.3)
PROT SERPL-MCNC: 6.4 G/DL (ref 6.4–8.4)
PROT UR STRIP-MCNC: NEGATIVE MG/DL
SODIUM SERPL-SCNC: 142 MMOL/L (ref 135–147)
SP GR UR STRIP.AUTO: >=1.03 (ref 1–1.03)
UROBILINOGEN UR STRIP-ACNC: <2 MG/DL

## 2025-06-29 PROCEDURE — 80053 COMPREHEN METABOLIC PANEL: CPT

## 2025-06-29 PROCEDURE — 36415 COLL VENOUS BLD VENIPUNCTURE: CPT

## 2025-06-29 PROCEDURE — 82043 UR ALBUMIN QUANTITATIVE: CPT

## 2025-06-29 PROCEDURE — 82570 ASSAY OF URINE CREATININE: CPT

## 2025-06-29 PROCEDURE — 81003 URINALYSIS AUTO W/O SCOPE: CPT

## 2025-06-29 PROCEDURE — 83036 HEMOGLOBIN GLYCOSYLATED A1C: CPT

## 2025-07-07 ENCOUNTER — OFFICE VISIT (OUTPATIENT)
Age: 65
End: 2025-07-07
Payer: COMMERCIAL

## 2025-07-07 VITALS
OXYGEN SATURATION: 98 % | BODY MASS INDEX: 27.06 KG/M2 | DIASTOLIC BLOOD PRESSURE: 72 MMHG | HEIGHT: 70 IN | HEART RATE: 85 BPM | SYSTOLIC BLOOD PRESSURE: 118 MMHG | WEIGHT: 189 LBS

## 2025-07-07 DIAGNOSIS — I10 PRIMARY HYPERTENSION: ICD-10-CM

## 2025-07-07 DIAGNOSIS — E78.5 DYSLIPIDEMIA: ICD-10-CM

## 2025-07-07 DIAGNOSIS — R73.03 PREDIABETES: ICD-10-CM

## 2025-07-07 DIAGNOSIS — N40.1 BENIGN PROSTATIC HYPERPLASIA WITH INCOMPLETE BLADDER EMPTYING: ICD-10-CM

## 2025-07-07 DIAGNOSIS — R39.9 UTI SYMPTOMS: ICD-10-CM

## 2025-07-07 DIAGNOSIS — R39.14 BENIGN PROSTATIC HYPERPLASIA WITH INCOMPLETE BLADDER EMPTYING: ICD-10-CM

## 2025-07-07 DIAGNOSIS — E11.9 TYPE 2 DIABETES MELLITUS WITHOUT COMPLICATION, WITHOUT LONG-TERM CURRENT USE OF INSULIN (HCC): Primary | ICD-10-CM

## 2025-07-07 DIAGNOSIS — R97.20 ELEVATED PSA: ICD-10-CM

## 2025-07-07 DIAGNOSIS — Z00.00 ANNUAL PHYSICAL EXAM: ICD-10-CM

## 2025-07-07 DIAGNOSIS — Z13.0 SCREENING FOR DEFICIENCY ANEMIA: ICD-10-CM

## 2025-07-07 PROCEDURE — 99214 OFFICE O/P EST MOD 30 MIN: CPT | Performed by: INTERNAL MEDICINE

## 2025-07-07 PROCEDURE — 99396 PREV VISIT EST AGE 40-64: CPT | Performed by: INTERNAL MEDICINE

## 2025-07-07 NOTE — ASSESSMENT & PLAN NOTE
Last lipid profile was within the normal limits cholesterol 128 triglycerides 90 HDL 36 LDL 74 continue with rosuvastatin 5 mg at bedtime  Orders:  •  Lipid panel; Future

## 2025-07-07 NOTE — PROGRESS NOTES
Adult Annual Physical  Name: Bon Snyder      : 1960      MRN: [id number]  Encounter Provider: Monty Mitchell MD  Encounter Date: 2025   Encounter department: [facility]    :  Assessment & Plan  Type 2 diabetes mellitus without complication, without long-term current use of insulin (HCC)    Lab Results   Component Value Date    HGBA1C 6.5 (H) 2025   A1c came down to 6.5 it was 6.9 before currently taking metformin XR 2 tablets in the morning and 1 tablet in the evening total of 1500 mg  Orders:  •  Hemoglobin A1C; Future  •  Albumin / creatinine urine ratio; Future    Dyslipidemia  Last lipid profile was within the normal limits cholesterol 128 triglycerides 90 HDL 36 LDL 74 continue with rosuvastatin 5 mg at bedtime  Orders:  •  Lipid panel; Future    Primary hypertension  Patient previously stable today 218/72 on losartan 25 mg daily continue       Elevated PSA  Patient is being followed by urologist periodically last manage PSA level was 5.307 slightly better compared to before.  Patient is going to have repeat blood test done probably in July at that time it will be decided about getting a biopsy or not done       Benign prostatic hyperplasia with incomplete bladder emptying  Currently patient is taking tamsulosin 0.4 mg daily continue with the same was also seen by the urologist.       Prediabetes    Orders:  •  Comprehensive metabolic panel; Future    Screening for deficiency anemia    Orders:  •  CBC and differential; Future    UTI symptoms    Orders:  •  Urinalysis with microscopic; Future    Annual physical exam             Preventive Screenings:  - Diabetes Screening: screening not indicated and has diabetes  - Cholesterol Screening: screening not indicated and has hyperlipidemia   - Hepatitis C screening: patient declines   - HIV screening: patient declines   - Colon cancer screening: screening up-to-date   - Lung cancer screening: screening not indicated   -  Prostate cancer screening: screening up-to-date     Immunizations:  - Immunizations due: Prevnar 20 and Zoster (Shingrix)    Counseling/Anticipatory Guidance:  - Alcohol: discussed moderation in alcohol intake and recommendations for healthy alcohol use.   - Drug use: discussed harms of illicit drug use and how it can negatively impact mental/physical health.   - Tobacco use: discussed harms of tobacco use and management options for quitting.   - Dental health: discussed importance of regular tooth brushing, flossing, and dental visits.   - Sexual health: discussed sexually transmitted diseases, partner selection, use of condoms, avoidance of unintended pregnancy, and contraceptive alternatives.   - Diet: discussed recommendations for a healthy/well-balanced diet.   - Exercise: the importance of regular exercise/physical activity was discussed. Recommend exercise 3-5 times per week for at least 30 minutes.   - Injury prevention: discussed safety/seat belts, safety helmets, smoke detectors, carbon monoxide detectors, and smoking near bedding or upholstery.          History of Present Illness patient is coming here for a follow-up evaluation with regards to symptoms of his diabetes, hypertension, dyslipidemia.  Patient status post cataract right eye doing much better.  No chest pain palpitation shortness of breath.  Medication reviewed labs reviewed.    Adult Annual Physical:  Patient presents for annual physical.     Diet and Physical Activity:  - Diet/Nutrition: portion control, heart healthy (low sodium) diet and consuming 3-5 servings of fruits/vegetables daily.  - Exercise: walking, moderate cardiovascular exercise, strength training exercises, 5-7 times a week on average and more than 2 hours on average.    General Health:  - Sleep: sleeps well and 7-8 hours of sleep on average.  - Hearing: normal hearing bilateral ears.  - Vision: vision problems, wears glasses and most recent eye exam < 1 year ago.  - Dental:  "regular dental visits and brushes teeth twice daily.     Health:  - History of STDs: no.   - Urinary symptoms: none.     Advanced Care Planning:  - Has an advanced directive?: yes    - Has a durable medical POA?: yes    - ACP document given to patient?: yes      Review of Systems   Constitutional:  Negative for chills and fever.   HENT:  Negative for ear pain and sore throat.    Eyes:  Negative for pain and visual disturbance.   Respiratory:  Negative for cough and shortness of breath.    Cardiovascular:  Negative for chest pain and palpitations.   Gastrointestinal:  Negative for abdominal pain and vomiting.   Genitourinary:  Negative for dysuria and hematuria.   Musculoskeletal:  Negative for arthralgias and back pain.   Skin:  Negative for color change and rash.   Neurological:  Negative for seizures and syncope.   All other systems reviewed and are negative.    Pertinent Medical History   As in the history of present illness        Medical History Reviewed by provider this encounter:  Tobacco  Allergies  Meds  Problems  Med Hx  Surg Hx  Fam Hx     .  Past Medical History   Past Medical History[1]  Past Surgical History[2]  Family History[3]   reports that he has never smoked. He has never used smokeless tobacco. He reports current alcohol use. He reports that he does not use drugs.  Current Outpatient Medications   Medication Instructions   • losartan (COZAAR) 25 mg, Oral, Daily   • metFORMIN (GLUCOPHAGE-XR) 500 mg 24 hr tablet Metformin  mg tablets 2 tablets in the morning with breakfast and 1 tablet with supper.  Patient takes total of 1500 mg daily   • rosuvastatin (CRESTOR) 5 mg, Oral, Daily   • tamsulosin (FLOMAX) 0.4 mg, Oral, Daily with dinner   Allergies[4]   Medications Ordered Prior to Encounter[5]   Social History[6]    Objective   /72 (BP Location: Right arm, Patient Position: Sitting, Cuff Size: Standard)   Pulse 85   Ht 5' 10\" (1.778 m)   Wt 85.7 kg (189 lb)   SpO2 98%   " BMI 27.12 kg/m²     Physical Exam    Cardiovascular:      Pulses: no weak pulses.           Dorsalis pedis pulses are 1+ on the right side and 1+ on the left side.        Posterior tibial pulses are 1+ on the right side and 1+ on the left side.   Feet:      Right foot:      Skin integrity: No ulcer, skin breakdown, erythema, warmth, callus or dry skin.      Left foot:      Skin integrity: No ulcer, skin breakdown, erythema, warmth, callus or dry skin.       Diabetic Foot Exam    Patient's shoes and socks removed.    Right Foot/Ankle   Right Foot Inspection  Skin Exam: skin normal and skin intact. No dry skin, no warmth, no callus, no erythema, no maceration, no abnormal color, no pre-ulcer, no ulcer and no callus.     Toe Exam: ROM and strength within normal limits.     Sensory   Monofilament testing: intact    Vascular  The right DP pulse is 1+. The right PT pulse is 1+.     Left Foot/Ankle  Left Foot Inspection  Skin Exam: skin normal and skin intact. No dry skin, no warmth, no erythema, no maceration, normal color, no pre-ulcer, no ulcer and no callus.     Toe Exam: ROM and strength within normal limits.     Sensory   Monofilament testing: intact    Vascular  The left DP pulse is 1+. The left PT pulse is 1+.     Assign Risk Category  No deformity present  No loss of protective sensation  No weak pulses  Risk: 0    Recent Results (from the past 8 weeks)   Comprehensive metabolic panel    Collection Time: 06/29/25  9:07 AM   Result Value Ref Range    Sodium 142 135 - 147 mmol/L    Potassium 4.6 3.5 - 5.3 mmol/L    Chloride 106 96 - 108 mmol/L    CO2 30 21 - 32 mmol/L    ANION GAP 6 4 - 13 mmol/L    BUN 21 5 - 25 mg/dL    Creatinine 0.95 0.60 - 1.30 mg/dL    Glucose, Fasting 151 (H) 65 - 99 mg/dL    Calcium 9.2 8.4 - 10.2 mg/dL    AST 16 13 - 39 U/L    ALT 21 7 - 52 U/L    Alkaline Phosphatase 56 34 - 104 U/L    Total Protein 6.4 6.4 - 8.4 g/dL    Albumin 4.5 3.5 - 5.0 g/dL    Total Bilirubin 0.67 0.20 - 1.00 mg/dL     eGFR 85 ml/min/1.73sq m   Hemoglobin A1C    Collection Time: 06/29/25  9:07 AM   Result Value Ref Range    Hemoglobin A1C 6.5 (H) Normal 4.0-5.6%; PreDiabetic 5.7-6.4%; Diabetic >=6.5%; Glycemic control for adults with diabetes <7.0% %     mg/dl   Albumin / creatinine urine ratio    Collection Time: 06/29/25  9:07 AM   Result Value Ref Range    Creatinine, Ur 149.9 Reference range not established. mg/dL    Albumin,U,Random 10.6 <20.0 mg/L    Albumin Creat Ratio 7 0 - 30 mg/g creatinine   UA w Reflex to Microscopic w Reflex to Culture    Collection Time: 06/29/25  9:07 AM    Specimen: Urine   Result Value Ref Range    Color, UA Yellow     Clarity, UA Clear     Specific Gravity, UA >=1.030 1.005 - 1.030    pH, UA 6.0 4.5, 5.0, 5.5, 6.0, 6.5, 7.0, 7.5, 8.0    Leukocytes, UA Negative Negative    Nitrite, UA Negative Negative    Protein, UA Negative Negative mg/dl    Glucose, UA Negative Negative mg/dl    Ketones, UA Negative Negative mg/dl    Urobilinogen, UA <2.0 <2.0 mg/dl mg/dl    Bilirubin, UA Negative Negative    Occult Blood, UA Negative Negative               [1]  Past Medical History:  Diagnosis Date   • Diabetes mellitus (HCC)    • Gout    [2]  Past Surgical History:  Procedure Laterality Date   • NO PAST SURGERIES     [3]  No family history on file.[4]  No Known Allergies[5]  Current Outpatient Medications on File Prior to Visit   Medication Sig Dispense Refill   • losartan (COZAAR) 25 mg tablet TAKE 1 TABLET (25 MG TOTAL) BY MOUTH DAILY. 90 tablet 1   • metFORMIN (GLUCOPHAGE-XR) 500 mg 24 hr tablet Metformin  mg tablets 2 tablets in the morning with breakfast and 1 tablet with supper.  Patient takes total of 1500 mg daily 270 tablet 0   • rosuvastatin (CRESTOR) 5 mg tablet TAKE 1 TABLET (5 MG TOTAL) BY MOUTH DAILY. 90 tablet 1   • tamsulosin (FLOMAX) 0.4 mg TAKE 1 CAPSULE BY MOUTH EVERY DAY WITH DINNER 90 capsule 1   • [DISCONTINUED] gentamicin (GARAMYCIN) 0.3 % ophthalmic solution Administer  1 drop to the right eye every 4 (four) hours 5 mL 0     No current facility-administered medications on file prior to visit.   [6]  Social History  Tobacco Use   • Smoking status: Never   • Smokeless tobacco: Never   Vaping Use   • Vaping status: Never Used   Substance and Sexual Activity   • Alcohol use: Yes     Comment: occasional   • Drug use: No   • Sexual activity: Yes     Birth control/protection: None

## 2025-07-07 NOTE — ASSESSMENT & PLAN NOTE
Currently patient is taking tamsulosin 0.4 mg daily continue with the same was also seen by the urologist.

## 2025-07-07 NOTE — ASSESSMENT & PLAN NOTE
Patient is being followed by urologist periodically last manage PSA level was 5.307 slightly better compared to before.  Patient is going to have repeat blood test done probably in July at that time it will be decided about getting a biopsy or not done

## 2025-07-07 NOTE — ASSESSMENT & PLAN NOTE
Lab Results   Component Value Date    HGBA1C 6.5 (H) 06/29/2025   A1c came down to 6.5 it was 6.9 before currently taking metformin XR 2 tablets in the morning and 1 tablet in the evening total of 1500 mg  Orders:  •  Hemoglobin A1C; Future  •  Albumin / creatinine urine ratio; Future

## 2025-07-21 DIAGNOSIS — E11.9 TYPE 2 DIABETES MELLITUS WITHOUT COMPLICATION, WITHOUT LONG-TERM CURRENT USE OF INSULIN (HCC): ICD-10-CM

## 2025-07-21 RX ORDER — METFORMIN HYDROCHLORIDE 500 MG/1
TABLET, EXTENDED RELEASE ORAL
Qty: 270 TABLET | Refills: 1 | Status: SHIPPED | OUTPATIENT
Start: 2025-07-21

## 2025-08-06 ENCOUNTER — VBI (OUTPATIENT)
Dept: ADMINISTRATIVE | Facility: OTHER | Age: 65
End: 2025-08-06

## 2025-08-21 DIAGNOSIS — N40.1 BENIGN PROSTATIC HYPERPLASIA WITH INCOMPLETE BLADDER EMPTYING: ICD-10-CM

## 2025-08-21 DIAGNOSIS — R39.14 BENIGN PROSTATIC HYPERPLASIA WITH INCOMPLETE BLADDER EMPTYING: ICD-10-CM

## 2025-08-21 RX ORDER — TAMSULOSIN HYDROCHLORIDE 0.4 MG/1
0.4 CAPSULE ORAL
Qty: 90 CAPSULE | Refills: 1 | Status: SHIPPED | OUTPATIENT
Start: 2025-08-21